# Patient Record
Sex: FEMALE | Race: WHITE | Employment: UNEMPLOYED | URBAN - METROPOLITAN AREA
[De-identification: names, ages, dates, MRNs, and addresses within clinical notes are randomized per-mention and may not be internally consistent; named-entity substitution may affect disease eponyms.]

---

## 2020-11-30 ENCOUNTER — OFFICE VISIT (OUTPATIENT)
Dept: URGENT CARE | Age: 55
End: 2020-11-30
Payer: COMMERCIAL

## 2020-11-30 VITALS — HEART RATE: 87 BPM | RESPIRATION RATE: 16 BRPM | TEMPERATURE: 98.4 F | OXYGEN SATURATION: 94 %

## 2020-11-30 DIAGNOSIS — Z20.822 SUSPECTED COVID-19 VIRUS INFECTION: Primary | ICD-10-CM

## 2020-11-30 PROCEDURE — 99202 OFFICE O/P NEW SF 15 MIN: CPT | Performed by: FAMILY MEDICINE

## 2020-12-01 NOTE — PROGRESS NOTES
This patient was seen in [facility] at [address] [place] [address] [facility] while in their vehicle due to COVID-19 pandemic with PPE and focused examination in order to decrease community viral transmission. The patient/guardian gave verbal consent to treat. The history is provided by the patient. Cough   The history is provided by the patient. This is a new problem. The current episode started 2 days ago. The problem occurs every few minutes. The problem has not changed since onset. The cough is non-productive. There has been no fever. Pertinent negatives include no chest pain. She has tried nothing for the symptoms. The treatment provided no relief. Risk factors:  has covid sxs- no known exposure. She is not a smoker. Her past medical history is significant for bronchitis. Her past medical history does not include asthma. Past Medical History:   Diagnosis Date    Ill-defined condition     graves disease        Past Surgical History:   Procedure Laterality Date    HX GYN      hysterectomy         No family history on file.      Social History     Socioeconomic History    Marital status:      Spouse name: Not on file    Number of children: Not on file    Years of education: Not on file    Highest education level: Not on file   Occupational History    Not on file   Social Needs    Financial resource strain: Not on file    Food insecurity     Worry: Not on file     Inability: Not on file    Transportation needs     Medical: Not on file     Non-medical: Not on file   Tobacco Use    Smoking status: Never Smoker    Smokeless tobacco: Never Used   Substance and Sexual Activity    Alcohol use: Not on file    Drug use: Not on file    Sexual activity: Not on file   Lifestyle    Physical activity     Days per week: Not on file     Minutes per session: Not on file    Stress: Not on file   Relationships    Social connections     Talks on phone: Not on file     Gets together: Not on file Attends Gnosticism service: Not on file     Active member of club or organization: Not on file     Attends meetings of clubs or organizations: Not on file     Relationship status: Not on file    Intimate partner violence     Fear of current or ex partner: Not on file     Emotionally abused: Not on file     Physically abused: Not on file     Forced sexual activity: Not on file   Other Topics Concern    Not on file   Social History Narrative    Not on file                ALLERGIES: Sulfa (sulfonamide antibiotics)    Review of Systems   Respiratory: Positive for cough. Cardiovascular: Negative for chest pain. All other systems reviewed and are negative. Vitals:    11/30/20 2000   Pulse: 87   Resp: 16   Temp: 98.4 °F (36.9 °C)   SpO2: 94%       Physical Exam  Vitals signs and nursing note reviewed. Constitutional:       General: She is not in acute distress. Appearance: She is not ill-appearing. Pulmonary:      Effort: Pulmonary effort is normal. No respiratory distress. Breath sounds: No wheezing. MDM    Procedures        ICD-10-CM ICD-9-CM    1. Suspected COVID-19 virus infection  Z20.828 V01.79 NOVEL CORONAVIRUS (COVID-19)     Use Mucinex DM twice a day    No orders of the defined types were placed in this encounter. No results found for any visits on 11/30/20. The patients condition was discussed with the patient and they understand. The patient is to follow up with primary care doctor. If signs and symptoms become worse the pt is to go to the ER. The patient is to take medications as prescribed.

## 2020-12-04 LAB — SARS-COV-2, NAA: NOT DETECTED

## 2022-05-04 ENCOUNTER — HOSPITAL ENCOUNTER (EMERGENCY)
Age: 57
Discharge: ANOTHER HOSPITAL | End: 2022-05-05
Attending: EMERGENCY MEDICINE
Payer: COMMERCIAL

## 2022-05-04 ENCOUNTER — APPOINTMENT (OUTPATIENT)
Dept: CT IMAGING | Age: 57
End: 2022-05-04
Attending: EMERGENCY MEDICINE
Payer: COMMERCIAL

## 2022-05-04 ENCOUNTER — APPOINTMENT (OUTPATIENT)
Dept: ULTRASOUND IMAGING | Age: 57
End: 2022-05-04
Attending: EMERGENCY MEDICINE
Payer: COMMERCIAL

## 2022-05-04 DIAGNOSIS — N30.00 ACUTE CYSTITIS WITHOUT HEMATURIA: ICD-10-CM

## 2022-05-04 DIAGNOSIS — E89.0 POSTABLATIVE HYPOTHYROIDISM: ICD-10-CM

## 2022-05-04 DIAGNOSIS — R25.1 TREMOR: ICD-10-CM

## 2022-05-04 DIAGNOSIS — G47.00 INSOMNIA, UNSPECIFIED TYPE: ICD-10-CM

## 2022-05-04 DIAGNOSIS — R44.0 AUDITORY HALLUCINATION: Primary | ICD-10-CM

## 2022-05-04 LAB
ALBUMIN SERPL-MCNC: 4.5 G/DL (ref 3.5–5)
ALBUMIN/GLOB SERPL: 1.2 {RATIO} (ref 1.1–2.2)
ALP SERPL-CCNC: 93 U/L (ref 45–117)
ALT SERPL-CCNC: 52 U/L (ref 12–78)
AMPHET UR QL SCN: NEGATIVE
ANION GAP SERPL CALC-SCNC: 7 MMOL/L (ref 5–15)
APAP SERPL-MCNC: <2 UG/ML (ref 10–30)
APPEARANCE UR: ABNORMAL
AST SERPL-CCNC: 36 U/L (ref 15–37)
BACTERIA URNS QL MICRO: NEGATIVE /HPF
BARBITURATES UR QL SCN: NEGATIVE
BENZODIAZ UR QL: NEGATIVE
BILIRUB SERPL-MCNC: 0.5 MG/DL (ref 0.2–1)
BILIRUB UR QL: NEGATIVE
BUN SERPL-MCNC: 12 MG/DL (ref 6–20)
BUN/CREAT SERPL: 10 (ref 12–20)
CALCIUM SERPL-MCNC: 9.1 MG/DL (ref 8.5–10.1)
CANNABINOIDS UR QL SCN: NEGATIVE
CHLORIDE SERPL-SCNC: 100 MMOL/L (ref 97–108)
CO2 SERPL-SCNC: 26 MMOL/L (ref 21–32)
COCAINE UR QL SCN: NEGATIVE
COLOR UR: ABNORMAL
COVID-19 RAPID TEST, COVR: NOT DETECTED
CREAT SERPL-MCNC: 1.18 MG/DL (ref 0.55–1.02)
DRUG SCRN COMMENT,DRGCM: NORMAL
EPITH CASTS URNS QL MICRO: ABNORMAL /LPF
ERYTHROCYTE [DISTWIDTH] IN BLOOD BY AUTOMATED COUNT: 14.3 % (ref 11.5–14.5)
GLOBULIN SER CALC-MCNC: 3.8 G/DL (ref 2–4)
GLUCOSE SERPL-MCNC: 145 MG/DL (ref 65–100)
GLUCOSE UR STRIP.AUTO-MCNC: NEGATIVE MG/DL
HCT VFR BLD AUTO: 44.4 % (ref 35–47)
HGB BLD-MCNC: 14.4 G/DL (ref 11.5–16)
HGB UR QL STRIP: ABNORMAL
HYALINE CASTS URNS QL MICRO: ABNORMAL /LPF (ref 0–2)
KETONES UR QL STRIP.AUTO: NEGATIVE MG/DL
LEUKOCYTE ESTERASE UR QL STRIP.AUTO: ABNORMAL
LIPASE SERPL-CCNC: 95 U/L (ref 73–393)
MCH RBC QN AUTO: 30.1 PG (ref 26–34)
MCHC RBC AUTO-ENTMCNC: 32.4 G/DL (ref 30–36.5)
MCV RBC AUTO: 92.7 FL (ref 80–99)
METHADONE UR QL: NEGATIVE
NITRITE UR QL STRIP.AUTO: NEGATIVE
NRBC # BLD: 0 K/UL (ref 0–0.01)
NRBC BLD-RTO: 0 PER 100 WBC
OPIATES UR QL: NEGATIVE
PCP UR QL: NEGATIVE
PH UR STRIP: 7 [PH] (ref 5–8)
PLATELET # BLD AUTO: 283 K/UL (ref 150–400)
PMV BLD AUTO: 10 FL (ref 8.9–12.9)
POTASSIUM SERPL-SCNC: 3.4 MMOL/L (ref 3.5–5.1)
PROT SERPL-MCNC: 8.3 G/DL (ref 6.4–8.2)
PROT UR STRIP-MCNC: ABNORMAL MG/DL
RBC # BLD AUTO: 4.79 M/UL (ref 3.8–5.2)
RBC #/AREA URNS HPF: ABNORMAL /HPF (ref 0–5)
SALICYLATES SERPL-MCNC: <1.7 MG/DL (ref 2.8–20)
SODIUM SERPL-SCNC: 133 MMOL/L (ref 136–145)
SOURCE, COVRS: NORMAL
SP GR UR REFRACTOMETRY: 1.01
T4 FREE SERPL-MCNC: 0.2 NG/DL (ref 0.8–1.5)
TSH SERPL DL<=0.05 MIU/L-ACNC: >100 UIU/ML (ref 0.36–3.74)
UROBILINOGEN UR QL STRIP.AUTO: 1 EU/DL (ref 0.2–1)
WBC # BLD AUTO: 13.6 K/UL (ref 3.6–11)
WBC URNS QL MICRO: >100 /HPF (ref 0–4)

## 2022-05-04 PROCEDURE — 84439 ASSAY OF FREE THYROXINE: CPT

## 2022-05-04 PROCEDURE — 84443 ASSAY THYROID STIM HORMONE: CPT

## 2022-05-04 PROCEDURE — 96360 HYDRATION IV INFUSION INIT: CPT

## 2022-05-04 PROCEDURE — 70450 CT HEAD/BRAIN W/O DYE: CPT

## 2022-05-04 PROCEDURE — 81001 URINALYSIS AUTO W/SCOPE: CPT

## 2022-05-04 PROCEDURE — 87635 SARS-COV-2 COVID-19 AMP PRB: CPT

## 2022-05-04 PROCEDURE — 36415 COLL VENOUS BLD VENIPUNCTURE: CPT

## 2022-05-04 PROCEDURE — 74011250636 HC RX REV CODE- 250/636: Performed by: EMERGENCY MEDICINE

## 2022-05-04 PROCEDURE — 85027 COMPLETE CBC AUTOMATED: CPT

## 2022-05-04 PROCEDURE — 93970 EXTREMITY STUDY: CPT

## 2022-05-04 PROCEDURE — 99285 EMERGENCY DEPT VISIT HI MDM: CPT

## 2022-05-04 PROCEDURE — 80307 DRUG TEST PRSMV CHEM ANLYZR: CPT

## 2022-05-04 PROCEDURE — 93005 ELECTROCARDIOGRAM TRACING: CPT

## 2022-05-04 PROCEDURE — 80143 DRUG ASSAY ACETAMINOPHEN: CPT

## 2022-05-04 PROCEDURE — 80053 COMPREHEN METABOLIC PANEL: CPT

## 2022-05-04 PROCEDURE — 74011250637 HC RX REV CODE- 250/637: Performed by: EMERGENCY MEDICINE

## 2022-05-04 PROCEDURE — 83690 ASSAY OF LIPASE: CPT

## 2022-05-04 PROCEDURE — 80179 DRUG ASSAY SALICYLATE: CPT

## 2022-05-04 RX ORDER — LEVOTHYROXINE SODIUM 137 UG/1
137 TABLET ORAL
Qty: 30 TABLET | Refills: 0 | Status: SHIPPED | OUTPATIENT
Start: 2022-05-04 | End: 2022-05-11

## 2022-05-04 RX ORDER — LORAZEPAM 0.5 MG/1
1 TABLET ORAL
Qty: 20 TABLET | Refills: 0 | Status: SHIPPED | OUTPATIENT
Start: 2022-05-04 | End: 2022-05-11

## 2022-05-04 RX ORDER — DIAZEPAM 5 MG/1
5 TABLET ORAL
Status: COMPLETED | OUTPATIENT
Start: 2022-05-04 | End: 2022-05-04

## 2022-05-04 RX ORDER — CEPHALEXIN 500 MG/1
500 CAPSULE ORAL 4 TIMES DAILY
Qty: 20 CAPSULE | Refills: 0 | Status: SHIPPED | OUTPATIENT
Start: 2022-05-04 | End: 2022-05-11

## 2022-05-04 RX ADMIN — SODIUM CHLORIDE 1000 ML: 9 INJECTION, SOLUTION INTRAVENOUS at 19:52

## 2022-05-04 RX ADMIN — DIAZEPAM 5 MG: 5 TABLET ORAL at 19:57

## 2022-05-04 NOTE — ED PROVIDER NOTES
EMERGENCY DEPARTMENT HISTORY AND PHYSICAL EXAM      Date: 5/4/2022  Patient Name: Nilsa Monte    History of Presenting Illness     Chief Complaint   Patient presents with    Anxiety     feeling over whelmed    Mental Health Problem     hearing voices, for about a month, not sleeping, not eating normal,denies SI and HI    Generalized Body Aches    Constipation       History Provided By: Patient, Patient's Mother and Patient's     HPI: Nilsa Monte, 49 y.o. female presents to the ED with cc of anxiety and mental health concern. 61-year-old female with a past medical history notable for Graves' disease status post ablation previously on Synthroid (patient self discontinued), hysterectomy, hyperlipidemia hypertension presents to the emergency department with a chief complaint of anxiety. Patient reports since January she has noted a depressed mood, insomnia and lack of interest in things. She reports she has been \"more shut in\" in her house. Patient reports over the past month she has been noting auditory hallucinations. She reports hearing footsteps and voices. Today the voices said \"get out. \"  Patient reports she drove to her mother's house and was feeling extremely anxious and tremulous. She reports she was twitching with her eyelids and arms. No history of seizures. Felt as though her legs were heavy. Patient has been on Cymbalta in the past, not currently on any medications. She denies any suicidal homicidal ideation. Patient states that she has had \"spells\" in the past, however these are short-lived. This has been more persistent. Patient also reports that she has had generalized myalgias for 3 weeks. Bilateral legs \" heavy. \"  Reports left greater than right leg swelling. Family history of DVT. Also reports some constipation, no abdominal pain or vomiting. Reports significant anxiety today. Patient does report a baseline of insomnia.     There are no other complaints, changes, or physical findings at this time. PCP: Jeovany Carvajal MD    No current facility-administered medications on file prior to encounter. Current Outpatient Medications on File Prior to Encounter   Medication Sig Dispense Refill    levothyroxine sodium (SYNTHROID PO) Take  by mouth.  atorvastatin calcium (ATORVASTATIN PO) Take  by mouth. Past History     Past Medical History:  Past Medical History:   Diagnosis Date    Ill-defined condition     graves disease       Past Surgical History:  Past Surgical History:   Procedure Laterality Date    HX GYN      hysterectomy       Family History:  No family history on file. Social History:  Social History     Tobacco Use    Smoking status: Never Smoker    Smokeless tobacco: Never Used   Substance Use Topics    Alcohol use: Not on file    Drug use: Not on file       Allergies: Allergies   Allergen Reactions    Sulfa (Sulfonamide Antibiotics) Hives         Review of Systems   Review of Systems   Constitutional: Negative for activity change, chills and fever. HENT: Negative for facial swelling and voice change. Eyes: Negative for redness. Respiratory: Negative for cough, shortness of breath and wheezing. Cardiovascular: Positive for palpitations and leg swelling. Negative for chest pain. Gastrointestinal: Positive for constipation. Negative for abdominal pain, diarrhea, nausea and vomiting. Genitourinary: Negative for decreased urine volume and dysuria. Musculoskeletal: Positive for myalgias. Negative for gait problem. Skin: Negative for pallor and rash. Neurological: Negative for tremors, facial asymmetry and numbness. Psychiatric/Behavioral: Negative for agitation. The patient is nervous/anxious. All other systems reviewed and are negative. Physical Exam   Physical Exam  Vitals and nursing note reviewed.    Constitutional:       Comments: 81-year-old female, resting in bed, no acute distress   HENT: Head: Normocephalic and atraumatic. Cardiovascular:      Rate and Rhythm: Normal rate and regular rhythm. Heart sounds: No murmur heard. No friction rub. No gallop. Pulmonary:      Effort: Pulmonary effort is normal.      Breath sounds: Normal breath sounds. Comments: Not hypoxic on room air  Abdominal:      Palpations: Abdomen is soft. Tenderness: There is no abdominal tenderness. Musculoskeletal:         General: Swelling present. Normal range of motion. Cervical back: Normal range of motion. No rigidity. Comments: Left greater than right pedal edema   Skin:     General: Skin is warm. Findings: No rash. Neurological:      General: No focal deficit present. Mental Status: She is alert. Sensory: No sensory deficit. Motor: No weakness. Psychiatric:         Attention and Perception: Attention normal.         Mood and Affect: Mood and affect normal.         Speech: Speech normal.         Behavior: Behavior normal. Behavior is not agitated. Behavior is cooperative. Thought Content: Thought content does not include homicidal or suicidal ideation. Thought content does not include homicidal or suicidal plan.          Cognition and Memory: Cognition normal.         Judgment: Judgment normal.         Diagnostic Study Results     Labs -     Recent Results (from the past 12 hour(s))   CBC W/O DIFF    Collection Time: 05/04/22  4:19 PM   Result Value Ref Range    WBC 13.6 (H) 3.6 - 11.0 K/uL    RBC 4.79 3.80 - 5.20 M/uL    HGB 14.4 11.5 - 16.0 g/dL    HCT 44.4 35.0 - 47.0 %    MCV 92.7 80.0 - 99.0 FL    MCH 30.1 26.0 - 34.0 PG    MCHC 32.4 30.0 - 36.5 g/dL    RDW 14.3 11.5 - 14.5 %    PLATELET 451 749 - 269 K/uL    MPV 10.0 8.9 - 12.9 FL    NRBC 0.0 0  WBC    ABSOLUTE NRBC 0.00 0.00 - 7.45 K/uL   METABOLIC PANEL, COMPREHENSIVE    Collection Time: 05/04/22  4:19 PM   Result Value Ref Range    Sodium 133 (L) 136 - 145 mmol/L    Potassium 3.4 (L) 3.5 - 5.1 mmol/L    Chloride 100 97 - 108 mmol/L    CO2 26 21 - 32 mmol/L    Anion gap 7 5 - 15 mmol/L    Glucose 145 (H) 65 - 100 mg/dL    BUN 12 6 - 20 MG/DL    Creatinine 1.18 (H) 0.55 - 1.02 MG/DL    BUN/Creatinine ratio 10 (L) 12 - 20      GFR est AA 58 (L) >60 ml/min/1.73m2    GFR est non-AA 48 (L) >60 ml/min/1.73m2    Calcium 9.1 8.5 - 10.1 MG/DL    Bilirubin, total 0.5 0.2 - 1.0 MG/DL    ALT (SGPT) 52 12 - 78 U/L    AST (SGOT) 36 15 - 37 U/L    Alk.  phosphatase 93 45 - 117 U/L    Protein, total 8.3 (H) 6.4 - 8.2 g/dL    Albumin 4.5 3.5 - 5.0 g/dL    Globulin 3.8 2.0 - 4.0 g/dL    A-G Ratio 1.2 1.1 - 2.2     URINALYSIS W/ RFLX MICROSCOPIC    Collection Time: 05/04/22  4:19 PM   Result Value Ref Range    Color YELLOW/STRAW      Appearance CLOUDY (A) CLEAR      Specific gravity 1.012      pH (UA) 7.0 5.0 - 8.0      Protein TRACE (A) NEG mg/dL    Glucose Negative NEG mg/dL    Ketone Negative NEG mg/dL    Bilirubin Negative NEG      Blood TRACE (A) NEG      Urobilinogen 1.0 0.2 - 1.0 EU/dL    Nitrites Negative NEG      Leukocyte Esterase LARGE (A) NEG      WBC >100 (H) 0 - 4 /hpf    RBC 0-5 0 - 5 /hpf    Epithelial cells FEW FEW /lpf    Bacteria Negative NEG /hpf    Hyaline cast 0-2 0 - 2 /lpf   LIPASE    Collection Time: 05/04/22  4:19 PM   Result Value Ref Range    Lipase 95 73 - 139 U/L   SALICYLATE    Collection Time: 05/04/22  4:19 PM   Result Value Ref Range    Salicylate level <4.4 (L) 2.8 - 20.0 MG/DL   ACETAMINOPHEN    Collection Time: 05/04/22  4:19 PM   Result Value Ref Range    Acetaminophen level <2 (L) 10 - 30 ug/mL   TSH 3RD GENERATION    Collection Time: 05/04/22  4:19 PM   Result Value Ref Range    TSH >100.00 (H) 0.36 - 3.74 uIU/mL   EKG, 12 LEAD, INITIAL    Collection Time: 05/04/22  8:05 PM   Result Value Ref Range    Ventricular Rate 80 BPM    Atrial Rate 80 BPM    P-R Interval 142 ms    QRS Duration 70 ms    Q-T Interval 392 ms    QTC Calculation (Bezet) 452 ms    Calculated P Axis 58 degrees Calculated R Axis 46 degrees    Calculated T Axis -33 degrees    Diagnosis       Normal sinus rhythm  Low voltage QRS  Nonspecific T wave abnormality  No previous ECGs available     COVID-19 RAPID TEST    Collection Time: 05/04/22  8:08 PM   Result Value Ref Range    Specimen source Nasopharyngeal      COVID-19 rapid test Not detected NOTD         Radiologic Studies -   DUPLEX LOWER EXT VENOUS BILAT   Final Result      CT HEAD WO CONT    (Results Pending)     CT Results  (Last 48 hours)    None        CXR Results  (Last 48 hours)    None          Medical Decision Making   I am the first provider for this patient. I reviewed the vital signs, available nursing notes, past medical history, past surgical history, family history and social history. Vital Signs-Reviewed the patient's vital signs. Patient Vitals for the past 12 hrs:   Temp Pulse Resp BP SpO2   05/04/22 2134     95 %   05/04/22 2130    (!) 142/97 94 %   05/04/22 2112 98.4 °F (36.9 °C) 77 16 (!) 132/99 93 %   05/04/22 2039    115/86 91 %   05/04/22 2024    124/84 92 %   05/04/22 2009    (!) 135/93 94 %   05/04/22 1957    (!) 141/100 93 %   05/04/22 1551 97.8 °F (36.6 °C) 95 18 (!) 150/84 96 %     Records Reviewed: Nursing Notes and Old Medical Records    Provider Notes (Medical Decision Making):     70-year-old female presents to the emergency department with multiple complaints. Patient reports generalized myalgias, leg swelling and constipation times a few weeks. This is on the background of a mental health concern of depressed mood, anhedonia and anxiety. The latter symptoms appeared to reach their peak today with increasing anxiety. In ED, vitals unremarkable. Patient adamantly denies any suicidal homicidal ideation. From a psychiatric standpoint, considerations include adjustment disorder, generalized anxiety disorder, panic disorder, major depressive disorder, auditory hallucinations can be seen with the latter.   No history of alcohol abuse, auditory hallucinations more consistent with psychiatric process. Would be unusual presentation for psychosis. Denies drug use. We will check basic labs and consult be smart. Patient also reports multiple medical complaints including leg swelling, leg heaviness and generalized weakness. Will check labs including thyroid and duplex. No fever to suspect infectious causes. COVID and basic labs given myalgias. Fluids and Valium. Patient's tremors do not appear to be seizure based on history. Will CT head given mental status change. Check EKG. ED Course:   Initial assessment performed. The patients presenting problems have been discussed, and they are in agreement with the care plan formulated and outlined with them. I have encouraged them to ask questions as they arise throughout their visit. ED Course as of 05/05/22 0922   Wed May 04, 2022   2014 Preliminary EKG interpreted by me. Shows normal sinus rhythm with a HR of 80. No ST elevations or depressions concerning for ischemia. Normal intervals. [MB]   2032 CBC reviewed with mild leukocytosis. CMP is unremarkable, mild hyponatremia noted, but would not suspect this to be causing patient's symptoms. Lipase reassuring. Urinalysis with leukesterase but no bacteria, will cover given patient's leukocytosis. Duplex negative. [MB]   2045 TSH >100, patient no longer taking synthroid, clinically this does not appear to be myxedema coma as there is no hyponatremia, hypothermia, bradycardia. Discussed this with the patient, will resume on her old dose. Will give endocrinology follow-up. Also requesting PCP referrals. [MB]   0776 Patient signed out pending [facility] consult and CT. Anticipate discharge with psych and PCP referrals. Return precautions.  [MB]   9197 PROGRESS NOTE  11:37 PM    Pt evaluated by [facility] who feels pt would benefit from admission due to excalation of auditory hallucinations making demands of patient that she is adhering to. Will notify us of bed placement [MK]      ED Course User Index  [MB] Lorna Reynolds MD  [MK] MD Dion Waggoner MD      Disposition:    Transferred to Another Facility      Diagnosis     Clinical Impression:   1. Postablative hypothyroidism    2. Acute cystitis without hematuria    3. Auditory hallucination    4. Insomnia, unspecified type    5. Tremor        Attestations:    Dino Davison MD    Please note that this dictation was completed with [employer]+, the computer voice recognition software. Quite often unanticipated grammatical, syntax, homophones, and other interpretive errors are inadvertently transcribed by the computer software. Please disregard these errors. Please excuse any errors that have escaped final proofreading. Thank you.

## 2022-05-05 ENCOUNTER — HOSPITAL ENCOUNTER (INPATIENT)
Age: 57
LOS: 6 days | Discharge: HOME | DRG: 885 | End: 2022-05-11
Attending: PSYCHIATRY & NEUROLOGY | Admitting: PSYCHIATRY & NEUROLOGY
Payer: COMMERCIAL

## 2022-05-05 VITALS
TEMPERATURE: 98.3 F | HEART RATE: 72 BPM | SYSTOLIC BLOOD PRESSURE: 113 MMHG | BODY MASS INDEX: 36.53 KG/M2 | DIASTOLIC BLOOD PRESSURE: 69 MMHG | WEIGHT: 227.29 LBS | RESPIRATION RATE: 16 BRPM | HEIGHT: 66 IN | OXYGEN SATURATION: 92 %

## 2022-05-05 DIAGNOSIS — F33.3 SEVERE RECURRENT MAJOR DEPRESSION WITH PSYCHOTIC FEATURES (HCC): ICD-10-CM

## 2022-05-05 PROBLEM — F32.9 MAJOR DEPRESSIVE DISORDER: Status: ACTIVE | Noted: 2022-05-05

## 2022-05-05 PROBLEM — F33.2 SEVERE RECURRENT MAJOR DEPRESSION WITHOUT PSYCHOTIC FEATURES (HCC): Status: RESOLVED | Noted: 2022-05-05 | Resolved: 2022-05-05

## 2022-05-05 PROBLEM — F33.2 SEVERE RECURRENT MAJOR DEPRESSION WITHOUT PSYCHOTIC FEATURES (HCC): Status: ACTIVE | Noted: 2022-05-05

## 2022-05-05 LAB
ATRIAL RATE: 80 BPM
CALCULATED P AXIS, ECG09: 58 DEGREES
CALCULATED R AXIS, ECG10: 46 DEGREES
CALCULATED T AXIS, ECG11: -33 DEGREES
COVID-19 RAPID TEST, COVR: NOT DETECTED
DIAGNOSIS, 93000: NORMAL
FLUAV RNA SPEC QL NAA+PROBE: NOT DETECTED
FLUBV RNA SPEC QL NAA+PROBE: NOT DETECTED
P-R INTERVAL, ECG05: 142 MS
Q-T INTERVAL, ECG07: 392 MS
QRS DURATION, ECG06: 70 MS
QTC CALCULATION (BEZET), ECG08: 452 MS
SARS-COV-2, COV2: NORMAL
SARS-COV-2, COV2: NORMAL
SARS-COV-2, COV2: NOT DETECTED
SOURCE, COVRS: NORMAL
VENTRICULAR RATE, ECG03: 80 BPM

## 2022-05-05 PROCEDURE — 65220000003 HC RM SEMIPRIVATE PSYCH

## 2022-05-05 PROCEDURE — 87636 SARSCOV2 & INF A&B AMP PRB: CPT

## 2022-05-05 PROCEDURE — 87635 SARS-COV-2 COVID-19 AMP PRB: CPT

## 2022-05-05 PROCEDURE — 74011250637 HC RX REV CODE- 250/637: Performed by: EMERGENCY MEDICINE

## 2022-05-05 PROCEDURE — 74011250637 HC RX REV CODE- 250/637: Performed by: PSYCHIATRY & NEUROLOGY

## 2022-05-05 RX ORDER — IBUPROFEN 200 MG
1 TABLET ORAL
Status: DISCONTINUED | OUTPATIENT
Start: 2022-05-05 | End: 2022-05-11 | Stop reason: HOSPADM

## 2022-05-05 RX ORDER — IBUPROFEN 400 MG/1
400 TABLET ORAL
Status: DISCONTINUED | OUTPATIENT
Start: 2022-05-05 | End: 2022-05-11 | Stop reason: HOSPADM

## 2022-05-05 RX ORDER — LEVOTHYROXINE SODIUM 50 UG/1
137.5 TABLET ORAL
Status: DISCONTINUED | OUTPATIENT
Start: 2022-05-05 | End: 2022-05-05 | Stop reason: HOSPADM

## 2022-05-05 RX ORDER — TRAZODONE HYDROCHLORIDE 50 MG/1
50 TABLET ORAL
Status: DISCONTINUED | OUTPATIENT
Start: 2022-05-05 | End: 2022-05-11 | Stop reason: HOSPADM

## 2022-05-05 RX ORDER — MAG HYDROX/ALUMINUM HYD/SIMETH 200-200-20
30 SUSPENSION, ORAL (FINAL DOSE FORM) ORAL
Status: DISCONTINUED | OUTPATIENT
Start: 2022-05-05 | End: 2022-05-11 | Stop reason: HOSPADM

## 2022-05-05 RX ORDER — RISPERIDONE 0.5 MG/1
0.5 TABLET, FILM COATED ORAL EVERY EVENING
Status: DISCONTINUED | OUTPATIENT
Start: 2022-05-05 | End: 2022-05-07

## 2022-05-05 RX ORDER — ADHESIVE BANDAGE
30 BANDAGE TOPICAL DAILY PRN
Status: DISCONTINUED | OUTPATIENT
Start: 2022-05-05 | End: 2022-05-11 | Stop reason: HOSPADM

## 2022-05-05 RX ORDER — ACETAMINOPHEN 500 MG
1000 TABLET ORAL
Status: DISCONTINUED | OUTPATIENT
Start: 2022-05-05 | End: 2022-05-11 | Stop reason: HOSPADM

## 2022-05-05 RX ORDER — HYDROXYZINE PAMOATE 50 MG/1
50 CAPSULE ORAL
Status: DISCONTINUED | OUTPATIENT
Start: 2022-05-05 | End: 2022-05-11 | Stop reason: HOSPADM

## 2022-05-05 RX ORDER — CEPHALEXIN 250 MG/1
500 CAPSULE ORAL 4 TIMES DAILY
Status: DISCONTINUED | OUTPATIENT
Start: 2022-05-05 | End: 2022-05-05 | Stop reason: HOSPADM

## 2022-05-05 RX ADMIN — HYDROXYZINE PAMOATE 50 MG: 50 CAPSULE ORAL at 17:43

## 2022-05-05 RX ADMIN — LEVOTHYROXINE SODIUM 137.5 MCG: 0.05 TABLET ORAL at 10:11

## 2022-05-05 RX ADMIN — CEPHALEXIN 500 MG: 250 CAPSULE ORAL at 10:10

## 2022-05-05 RX ADMIN — RISPERIDONE 0.5 MG: 0.5 TABLET ORAL at 17:15

## 2022-05-05 NOTE — PROGRESS NOTES
Problem: Anxiety-Behavioral Health (Adult/Pediatric)  Goal: *STG: Remains safe in hospital  Outcome: Progressing Towards Goal     Problem: Anxiety-Behavioral Health (Adult/Pediatric)  Goal: *STG: Participates in treatment plan  Outcome: Progressing Towards Goal

## 2022-05-05 NOTE — BSMART NOTE
Awaiting PCR test per access for patient placement. This writer spoke with charge nurse to determine results of test and as reported by [facility] it hasnot resulted as of yet.

## 2022-05-05 NOTE — ED NOTES
Be smart computer at bedside, . Pt appears calmer , resting with eyes closed, easily arousable. Family at bedside, updated of plan of care. Will cont to monitor closely.

## 2022-05-05 NOTE — ED NOTES
Pt calm and cooperative, Family member at bedside. Waiting on covid results, contacted lab regarding delay. Updated pt of plan of care.

## 2022-05-05 NOTE — BH NOTES
Patient arrived on the unit voluntary. She reports AH for one month, not sleeping, eating and feeling anxious and overwhelmed. She reports she has been isolating and secluding herself since January. She denies any outpatient providers for mental health or inpatient admissions. She plans on returning back home with family. She is able to care for herself and feels she has good support. She will be referred to out patient [place] [id number] services.

## 2022-05-05 NOTE — GROUP NOTE
STEVIE  GROUP DOCUMENTATION INDIVIDUAL                                                                          Group Therapy Note    Date: 5/5/2022    Group Start Time: 1400  Group End Time: 1391  Group Topic: Process Group - Inpatient    [address], [address] [facility] GROUP DOCUMENTATION GROUP    Group Therapy Note    Attendees: Focus of session was a discussion of the question: Is there a problem in your life that you could solve, or make better, if you would? We spoke about the need to look at all areas of your life: physical health, mental health, emotional health, relationships, self-care, spiritual and to ask ourselves what we could fix if we were willing to do the work and put in the time. We spoke about specific excuses that are being made and how we can overcome those excuses. Attendance: Attended    Patient's Goal:       Demonstrates effective anxiety reduction strategies               Interventions/techniques: Informed, Validated, Promoted peer support and Supported    Follows Directions: Followed directions    Interactions: Interacted appropriately    Mental Status: Congruent, Flat, Preoccupied and Suspicious    Behavior/appearance: Attentive, Cooperative, Needed prompting and Withdrawn/quiet    Goals Achieved: Able to engage in interactions, Discussed coping and Identified feelings      Additional Notes:  Inna Vizcarra participated in group with prompting. She spoke about how she is really tired after being in ER last night. She did engage with prompting. She spoke about how she has been isolative during the pandemic and has been frustrated with many aspects of it. She did not share specific symptoms but she would engage with prompting.       Singh Francisco

## 2022-05-05 NOTE — PROGRESS NOTES
Problem: Anxiety-Behavioral Health (Adult/Pediatric)  Goal: *STG: Attends activities and groups  Outcome: Progressing Towards Goal

## 2022-05-05 NOTE — BH NOTES
Patient arrived on the unit with EMS. Patient had a saline lock  20 guage in the left [facility] upon arrival to the [facility]. This writer removed the saline lock.

## 2022-05-05 NOTE — H&P
[facility]   Admission History & Physical        5/5/2022 5:44 PM  Patient: Syed Triplett 1965  PCP: Nathanael Orellana MD    HISTORY  Chief Complaint:   HPI:    24-year-old female with a past medical history of  Graves' disease status post ablation and placed on  on Synthroid (patient self discontinued), hysterectomy, hyperlipidemia, hypertension, and Anxiety. She reported worsening anxiety and depression, insomnia and lack of interest in things. Today she felt feeling extremely anxious and tremulous with  twitching of  her eyelids and arms. Reported feeling generalized weakness and lack of energy with no focal neurological deficits. Reported palpitations. She has been started on Keflex recently for acute UTI. Currently no dysuria ,no  urgency or frequency  No fever no shortness of breath. No cough no chest pain. Reported intermittent swelling of both feet especially with standing. No nausea ,no vomiting or diarrhea. Reported chronic constipation. Stool is not dark, not bloodstained or maroon-colored. Past Medical History:  Past Medical History:   Diagnosis Date    Anxiety disorder     Depression     Hypertension     Ill-defined condition     graves disease    Thyroid disease      Past Surgical History:  Past Surgical History:   Procedure Laterality Date    HX GYN      hysterectomy     Medication:  Prior to Admission medications    Medication Sig Start Date End Date Taking? Authorizing Provider   cephALEXin (Keflex) 500 mg capsule Take 1 Capsule by mouth four (4) times daily for 5 days. 5/4/22 5/9/22 Yes Christal Mcbride MD   levothyroxine (Synthroid) 137 mcg tablet Take 1 Tablet by mouth Daily (before breakfast). 5/4/22  Yes Christal Mcbride MD   LORazepam (Ativan) 0.5 mg tablet Take 2 Tablets by mouth every eight (8) hours as needed for Anxiety. Max Daily Amount: 3 mg. 5/4/22   Christal Mcbride MD   levothyroxine sodium (SYNTHROID PO) Take  by mouth. Provider, Historical   atorvastatin calcium (ATORVASTATIN PO) Take  by mouth. Provider, Historical     Allergies: Allergies   Allergen Reactions    Sulfa (Sulfonamide Antibiotics) Hives     Social History:  Social History     Tobacco Use    Smoking status: Former Smoker     Packs/day: 1.00     Years: 15.00     Pack years: 15.00     Quit date: 5/3/2021     Years since quittin.0    Smokeless tobacco: Never Used    Tobacco comment: Pt quit smoking over 1 year ago. Pt anxious at this time. Vaping Use    Vaping Use: Never used   Substance Use Topics    Alcohol use: Yes     Alcohol/week: 1.0 - 2.0 standard drink     Types: 1 - 2 Cans of beer per week     Comment: Only drinks socially or special occasions    Drug use: Never     Family History:  No family history on file. Review of systems:   Total of 12 systems reviewed as follows:  POSITIVE= bolded text  Negative = text not bolded       General:  fever, chills, sweats, generalized weakness, weight loss/gain, loss of appetite   Eyes:    blurred vision, eye pain, loss of vision, double vision  ENT:    rhinorrhea, pharyngitis   Respiratory:  cough, sputum production, SOB, HERNANDEZ, wheezing, pleuritic pain   Cardiology:   chest pain, palpitations, orthopnea, PND, edema, syncope   Gastrointestinal:  abdominal pain , N/V, diarrhea, dysphagia, constipation, bleeding   Genitourinary:  frequency, urgency, dysuria, hematuria, incontinence, prostatism   Muskuloskeletal: arthralgia, myalgia, back pain  Hematology:   easy bruising, nose or gum bleeding, lymphadenopathy   Dermatological: rash, ulceration, pruritis, color change / jaundice  Endocrine:   hot flashes or polydipsia   Neurological:  headache, dizziness, confusion, focal weakness, paresthesia, speech difficulties, memory loss, gait difficulty  Psychological: feelings of anxiety, depression, agitation    PHYSICAL EXAM:  Patient Vitals for the past 24 hrs:   Temp Pulse Resp BP SpO2   22 1205 (!) 96.5 °F (35.8 °C) 82 18 119/76 94 %     General:    Alert, cooperative, no distress, appears stated age. HEENT: Atraumatic, anicteric sclerae, pink conjunctivae     No oral ulcers, mucosa moist, throat clear, dentition fair  Neck:  Supple, symmetrical;   thyroid non tender  Lungs:   Clear to auscultation bilaterally. No wheezing or rhonchi. No rales. Chest wall:  No tenderness. No accessory muscle use. Heart:   Regular rhythm. No  murmur. No edema  Abdomen:   Soft, non-tender. Not distended. Bowel sounds normal  Extremities: No cyanosis. No clubbing      Capillary refill normal,  Radial pulse 2+,  DP normal  Skin:     Not pale. Not jaundiced. No rashes   Psych:  Good insight. Not depressed. Not anxious or agitated. Neurologic: EOMs intact. No facial asymmetry. No aphasia or slurred speech. Symmetrical strength, Sensation grossly intact. Alert and oriented X 4.     Lab Data Reviewed:    Recent Results (from the past 24 hour(s))   EKG, 12 LEAD, INITIAL    Collection Time: 05/04/22  8:05 PM   Result Value Ref Range    Ventricular Rate 80 BPM    Atrial Rate 80 BPM    P-R Interval 142 ms    QRS Duration 70 ms    Q-T Interval 392 ms    QTC Calculation (Bezet) 452 ms    Calculated P Axis 58 degrees    Calculated R Axis 46 degrees    Calculated T Axis -33 degrees    Diagnosis       Normal sinus rhythm  Low voltage QRS  Nonspecific T wave abnormality  No previous ECGs available  Confirmed by Kristian Mcgowan ([id number]) on 5/5/2022 2:20:08 PM     COVID-19 RAPID TEST    Collection Time: 05/04/22  8:08 PM   Result Value Ref Range    Specimen source Nasopharyngeal      COVID-19 rapid test Not detected NOTD     DRUG SCREEN, URINE    Collection Time: 05/04/22 10:00 PM   Result Value Ref Range    AMPHETAMINES Negative NEG      BARBITURATES Negative NEG      BENZODIAZEPINES Negative NEG      COCAINE Negative NEG      METHADONE Negative NEG      OPIATES Negative NEG      PCP(PHENCYCLIDINE) Negative NEG      THC (TH-CANNABINOL) Negative NEG      Drug screen comment (NOTE)    SARS-COV-2    Collection Time: 05/05/22 12:50 AM   Result Value Ref Range    SARS-CoV-2 by PCR Please find results under separate order     SARS-COV-2    Collection Time: 05/05/22  5:43 AM   Result Value Ref Range    SARS-CoV-2 by PCR Please find results under separate order     COVID-19 RAPID TEST    Collection Time: 05/05/22  5:43 AM   Result Value Ref Range    Specimen source Nasopharyngeal      COVID-19 rapid test Not detected NOTD     COVID-19 WITH INFLUENZA A/B    Collection Time: 05/05/22  5:43 AM   Result Value Ref Range    SARS-CoV-2 by PCR Not detected NOTD      Influenza A by PCR Not detected NOTD      Influenza B by PCR Not detected NOTD         EKG: Normal sinus rhythm. rate 80/mint. Normal WV and  QRS duration     Radiology:   CT head 05/04/2022 : IMPRESSION No acute findings. ASSESSMENT / PLAN    Principal Problem:    Severe recurrent major depression with psychotic features (5/5/2022)  Evaluation and treatment as per psychiatry    Active problems    Acute cystitis without hematuria   Continue keflex antibiotic course. Smoking  Continue nicotine patch  Hypothyroidism  Continue Synthroid 137 MCG daily  Hyperlipidemia  Currently on no medications. Hypertension  Blood pressure is currently controlled 119/76. Not on blood pressure medication  Low-salt diet and lifestyle modification is discussed. She will follow-up with primary care provider    SAFETY: In place   Code Status:Full Code   DVT prophylaxis:Not indicated, the pt is ambulatory   Stress Ulcer prophylaxis: Not indicated  Bladder catheter:No   Family Contact Info: On file  Bedded: [facility] Room 234/0102  Disposition: TBD, likely home when stable  Admission status:  Full admission     -Tentative plan of care discussed with patient / family, who demonstrated understanding and is in agreement to the above.     Questions answered   -Discussed with RN   -Total time 60 Minutes >50% of visit spent in counseling and coordination of care. Reviewed previous records    [id number] Ghanshyam Talbot MD  [facility] Hospitalist  [phone]

## 2022-05-05 NOTE — BH NOTES
100 [facility] 60  Master Treatment Plan for Jodi Garcia    Date Treatment Plan Initiated: 04/05/2022  Treatment Plan Modalities:  Type of Modality Amount  (x minutes) Frequency (x/week) Duration (x days) Name of Responsible Staff   [address] [place] [address] meetings to encourage peer interactions 30 14 1 Britta Waterman., [facility]   Group psychotherapy to assist in building coping skills and internal controls 60 5 1 Penelope Sethi., [facility]  Gail Johns., [facility]   Therapeutic activity groups to build coping skills 61 7 1 Shade Goins., [facility]     Psychoeducation in group setting to address:   Medication education  Coping Skills  Symptom Management   60 7 1 Penelope Sethi., [facility]  Gail Johns., [facility]  Stormy Cornejo., MARCELA Thomas RN   Discharge planning   60 2 1 Jorge Burch.,    Spirituality    60 2 1 Polina Escalona.   Physician medication management   15 7 1 ZACKERY Harrison MD                                         Treatment Team Signatures    I have participated in the development of this plan of treatment and agree to its implementation.     Patient Signature       Patient Printed Name Date/Time   Social Work/Therapist Signature       Social Work/Therapist Printed Name Date/Time   MARCELA Ayoub RN RN Printed Name  Bogdan Major RN Date/Time   Other Signature     Other Signature Date/Time   MD Signature       MD Printed Name Date/Time

## 2022-05-05 NOTE — DISCHARGE INSTRUCTIONS
Please take the antibiotics for urinary tract infection and resume taking your Synthroid. Please use the Ativan to help with sleep and anxiety. Please follow-up with 1 the primary care doctors below as well as the endocrinologist above. Please follow-up with one of the psychiatric resources below.     Local Primary Care Physicians  [facility] [phone]  MD Sandra Richardson, MD Micaela Ramos MD [facility] Doctors [phone]  Chelsy Almanza, [facility]  Benito Adler, MD Jeff Gamez, MD Sarina Penningtonmarielena Russells  [phone]  Donald Akins, MD Ana Paula Farmer MD [address] [facility] [phone]  MD Edward Box, MD Sheela Espitia MD   [facility] [phone]  EDWARD CONNERGSCJ , MD Shari Oreilly MD  [facility], NP [address] [facility] [address] [phone]  MD Kayleen Enciso, MD Cheko Braswell, MD German Cordon, MD Casey Richey MD Leana Chandler, MD   5304 [facility] [phone]  Gena Muñiz MD [facility] [phone]  MD Aleksander House, NP  Mardelle Peri, MD Annah Saint, MD Elroy Hockey, MD Rubi Figueroa MD   [address] [place] [address] [phone]  MD Petr Coronado, FNP  Huma Arnold, NP  Dina Kam, MD Edel Mireles, MD Milad Lester MD [facility] [phone]  Luevenia Lean, MD Elena Bailey, MD Raynald Dory, MD Trinda Goldberg, MD Sondra Odor, MD   [address] [phone]  MD Fallon Estrada MD [place] [phone]  [facility], MD Fifi Matute, 4[address] [facility] [phone]  Klaus Ferris, MD Bill Ramirez MD Renella Europe, MD Brigida Hamel, MD Tad Prudent, NP  Vera Alfaro Raul Leonard MD [address]   [phone]  MD Mari Smith MD Wardell Brazen, MD   2102 [facility] [phone]  MD Felice Gresham, FNP  Leoan Tsai, PEDRO PABLO Tsai, SALMAP  PEDRO PABLO Bowen MD Jorden Kinds, NP   Vance Mcclain DO             Miscellaneous:  Jake Riley MD [facility] Departments     For adult and child immunizations, family planning, TB screening, STD testing and women's health services. [place]: [place] [phone]      [facility] D 25   [address]   [address]   170 [facility] 200 [facility] [address] [phone]      [address] [place] [address]          [address] [address]     For primary care services, woman and child wellness, and some clinics providing specialty care. [facility] -- 1011 [facility]. [address] [place] [address] [phone]   411 [facility] 200 [facility] 3614 [facility] [phone]   339 [facility] [address] [place] [address] [phone] 11878 [facility] TRAN.SL [address] [place] [address] [address] [place] [address]  [phone]   [address] [place] [address] [phone]   [facility] 81 [facility] [phone]   [facility] [address] [place] [address] [phone]   [facility]: [facility] [address] [facility], [address] [phone]     [place] [address] [phone]   [place]'s Outreach 5850 [facility]  [phone]   [facility]  [address], [address] ([email or web address]) [phone]         Sexual Health/Woman Wellness Clinics    For STD/HIV testing and treatment, pregnancy testing and services, men's health, birth control services, LGBT services, and hepatitis/HPV vaccine services. [employer] for [place] [employer] [address]. [facility] [address] [facility] [address] [facility] 1579 600 EYashira Parikh [phone]   [facility] [address], 5th floor [phone]   Pregnancy [address] for Women [address] [phone]         [place] [address]67 [facility] [address]   [phone]   [place]   [phone]   Women, Infant and Children's Services: [address] [phone]       [address] [phone]   [facility]   [phone]   [address] [place] [address] Pkwy   [phone]   [facility]     [phone]   1325 [facility]   [phone]   3 [facility] [phone]     [address] Peggy Gonzalez Outpatient Mental Health Providers    Accepts Insured Patients Only:  [facility]  [address]       [phone]   Near the corner of [facility] and [facility] 430 in the near [facility]. Accepts most insurance including Medicaid/Medicare. No psychiatry. On the [facility] bus line. [address] . Sharilinhsammy [phone]  [address] [facility] ([address]  [address] [place] [address]. 30 [facility], [address] [place])     [phone]   Accepts most major insurances. Psychiatry available. Some DBT groups. [facility] 100[address] [facility])    [phone]   Mixture of psychologists and psychiatrists. They do not accept Medicaid or Medicare. [facility]  [address] [place], [place] [address]ve       [phone]   Mixture of clinical social workers and psychologists.       Sliding Scale/Financial Aid/Differing Payment Options  [facility]  975 [facility])      [phone]   Our own Adriana Olvera and Arsenio Corie. Variety of treatment options, including DBT. [address]       [phone]   Provides a variety of group and individual counseling options. Insurance, Medicaid, Medicare and sliding scale      Medicaid/Medicare providers in the [address] area  [address] [place] [address]. [address]       [phone]    [facility]         [address]       [phone]    [place]  [address] [facility] [address] [place] [address] Ave    [phone] ex.  [address] [place] [address]     [phone]    [address] [place] [address]      [phone]      Services for patients without Medicaid, [employer] or Insurance  [employer]       5303 [facility],[address] & 300

## 2022-05-05 NOTE — BH NOTES
Admission Note:     Patient arrived on the unit @ 12 noon from [facility] ED on voluntary admission. Patient was escorted on the unit per stretcher by [address] and 2 [facility] personnel. Dr. Edna Vick and Nursing Supervisor were notified of patient's arrival. Consult placed for Medical H&P done by Hospitalist,. Patient alert and oriented x 4. Calm and cooperative. Affect/mood flat and depressed. Admits to auditory hallucinations that begun about 3 months ago. She also went off the thyroid medication for a couple of weeks and just today restarted it. Speech soft spoken. Denies SI and HI. No delusional statements made. Patient denied having a license with [employer]. Pt stopped smoking 1 year ago, May 3 rd, 2022. Patient was counseled on smoking cessation to include recognizing dangerous situations, coping skills, and information on quitting. Covid test negative. Treatment plan started. Pt shares that during the two years isolative and a lot of time alone she became reclusive. She could not go outside of her house. The voices recently started when she stopped thyroid medication and have continued and are getting quiet aggressive at times. She endorses that sometimes they scream at her. Discussed with Dr. Edna Vick pt arrival and admission. Risperdal 0.5 mg. PO ordered and given this evening @ 17:15 PM.    PRN Medication Documentation    Specific patient behavior that led to need for PRN medication: restlessness. Staff interventions attempted prior to PRN being given:  Therapeutic conversation  PRN medication given: Hydroxyzine 50 mg. PO anxiety. Patient response/effectiveness of PRN medication:  Resting.

## 2022-05-05 NOTE — BH NOTES
TRANSFER - IN REPORT:    Verbal report received from Suzanne Julien RN on Fred Nash  being received from [facility] ED for routine progression of care. Report consisted of patients Situation, Background, Assessment and   Recommendations(SBAR). Information from  ED Summary was reviewed with the receiving nurse. Opportunity for questions and clarification was provided. Assessment completed upon patients arrival to unit and care assumed.

## 2022-05-05 NOTE — BSMART NOTE
Comprehensive Assessment Form Part 1      Section I - Disposition    Axis I - Depressive Disorder NOS                Anxiety Disorder      The Medical Doctor to Psychiatrist conference was not completed. The Medical Doctor is in agreement with Psychiatrist disposition because of (reason) patient consents for voluntary admission. Patient requesting admission for [facility]. The plan is admit to behavioral health. The on-call Psychiatrist consulted was Dr. Cole Zheng. The admitting Psychiatrist will be Dr. Cole Zheng. The admitting Diagnosis is Depressive Disorder NOS. The Payor [facility] [employer] HMO/CHOICE PLUS/POS. The name of the representative was . This was approved for  days. The authorization number is . This writer reviewed the Markt [address] in nursing flowsheet and the risk level assigned is no risk. Based on this assessment, the risk of suicide is no risk and the plan is admit to behavioral health. Section II - Integrated Summary  Summary:  Patient is 49year old female reporting to ED hearing voices, for about a month, not sleeping, not eating normal,denies SI and HI, feeling overwhelmed. At bedside, patient reported coming to ED due to anxiety, panic and increased panic attacks. As reported it started in [facility]. Her  at bedside stated they noticed at that time a she started isolating and secluding herself. As reported the change has continued and patient currently does not interact with her adult children, mother, grandchildren,  or others as she normally would. Patient does not leave the house and stopped going to the store. Patient stated \"I don't want to see anyone, don't want to talk and I don't want to go anywhere. Patient reported feeling like she is falling apart mentally and physically. Patient denied suicidal, homicidal thoughts. Patient reported auditory hallucinations that have increased.  As reported she usually hears one voice and references the voice a male \"like an obsessive boyfriend\", patient laughed and stated but I don;t have boyfriend. Patient reported he knows her name and she does not know why, he tells her he loves her and I belong to him. Patient reported voices are day and night and everywhere she goes. Patient reported today she was in the home and heard a bunch of screaming voices that wasnot main voice she hears telling her to get out of the house she was in danger. Patient reported picking up her keys and running out of the home. Patient reported not feeling safe in her home and she had to get out. Patient expressed feeling paranoid. Patient reported she was taking Thyroid medicine but she wanted a reset because she was frustrated with weight gain. Patient reported stopping her medication 2-3 months ago without consulting her doctor. As reported her doctor is retiring and she was going to wait and get new provider and reset. Patient denied any mental health providers and denied past psychiatric admissions. Patient reported her  had COVID two times and as reported after \"I start acting different but its worst now\". Patient reported she has been trying to find her way. Patient reported for the past 9 years she has watched her grandkids in home  and in August 2021 they started school. Patient is currently unemployed. Patient reported her one year anniversary from nicotine was today but she smoked a cigarette because of how paranoid she was. As reported over the past two days she has only had fluids but overall she has had decreased appetite. Patient reported broken sleep. Patient was cooperative during assessment. Anxious. Thought process linear and goal oriented to get back to normal self. Patient consents for voluntary admission. The patienthas demonstrated mental capacity to provide informed consent. The information is given by the patient, spouse/SO and parent.   The Chief Complaint is increased depression, hallucinations . The Precipitant Factors are medication stopped, unknown factors. Previous Hospitalizations: no  The patient has not previously been in restraints. Current Psychiatrist and/or  is none. Lethality Assessment:    The potential for suicide noted by the following: not noted . The potential for homicide is not noted. The patient has not been a perpetrator of sexual or physical abuse. There are not pending charges. The patient is not felt to be at risk for self harm or harm to others. The attending nurse was advised not noted. Section III - Psychosocial  The patient's overall mood and attitude is low mood, anxious, depressed. Feelings of helplessness and hopelessness are not observed. Generalized anxiety is not observed. Panic is not observed. Phobias are not observed. Obsessive compulsive tendencies are not observed. Section IV - Mental Status Exam  The patient's appearance shows no evidence of impairment. The patient's behavior shows no evidence of impairment. The patient is oriented to time, place, person and situation. The patient's speech shows no evidence of impairment. The patient's mood is depressed and is anxious. The range of affect shows no evidence of impairment. The patient's thought content demonstrates no evidence of impairment. The thought process shows no evidence of impairment. The patient's perception shows no evidence of impairment. The patient's memory shows no evidence of impairment. The patient's appetite shows no evidence of impairment. The patient's sleep has evidence of insomnia. The patient shows little insight. The patient's judgement shows no evidence of impairment. Section V - Substance Abuse  The patient is using substances. The patient is using tobacco by inhalation for greater than 10 years with last use on yesterday. The patient has experienced the following withdrawal symptoms: N/A.       Section VI - Living Arrangements  The patient is . The spouses approximate age is 52's and appears to be in good health. The patient lives with a spouse. The patient has adult children. The patient does plan to return home upon discharge. The patient does not have legal issues pending. The patient's source of income comes from family. Synagogue and cultural practices have not been voiced at this time. The patient's greatest support comes from  and mother and this person will be involved with the treatment. The patient has not been in an event described as horrible or outside the realm of ordinary life experience either currently or in the past.  The patient has not been a victim of sexual/physical abuse. Section VII - Other Areas of Clinical Concern  The highest grade achieved is some college with the overall quality of school experience being described as some college. The patient is currently unemployed and speaks [place] as a primary language. The patient has no communication impairments affecting communication. The patient's preference for learning can be described as: can read and write adequately.   The patient's hearing is normal.  The patient's vision is normal.      Tami Yang

## 2022-05-06 LAB
APPEARANCE UR: CLEAR
BACTERIA URNS QL MICRO: NEGATIVE /HPF
BILIRUB UR QL: NEGATIVE
COLOR UR: NORMAL
EPITH CASTS URNS QL MICRO: NORMAL /LPF
GLUCOSE UR STRIP.AUTO-MCNC: NEGATIVE MG/DL
HGB UR QL STRIP: NEGATIVE
KETONES UR QL STRIP.AUTO: NEGATIVE MG/DL
LEUKOCYTE ESTERASE UR QL STRIP.AUTO: NEGATIVE
NITRITE UR QL STRIP.AUTO: NEGATIVE
PH UR STRIP: 6 [PH] (ref 5–8)
PROT UR STRIP-MCNC: NEGATIVE MG/DL
RBC #/AREA URNS HPF: NORMAL /HPF (ref 0–5)
SP GR UR REFRACTOMETRY: 1.01 (ref 1–1.03)
UA: UC IF INDICATED,UAUC: NORMAL
UROBILINOGEN UR QL STRIP.AUTO: 0.2 EU/DL (ref 0.2–1)
WBC URNS QL MICRO: NORMAL /HPF (ref 0–4)

## 2022-05-06 PROCEDURE — 81001 URINALYSIS AUTO W/SCOPE: CPT

## 2022-05-06 PROCEDURE — 65220000003 HC RM SEMIPRIVATE PSYCH

## 2022-05-06 PROCEDURE — 74011250637 HC RX REV CODE- 250/637: Performed by: PSYCHIATRY & NEUROLOGY

## 2022-05-06 PROCEDURE — 90792 PSYCH DIAG EVAL W/MED SRVCS: CPT | Performed by: PSYCHIATRY & NEUROLOGY

## 2022-05-06 RX ORDER — RISPERIDONE 0.5 MG/1
0.25 TABLET, FILM COATED ORAL DAILY
Status: DISCONTINUED | OUTPATIENT
Start: 2022-05-06 | End: 2022-05-07

## 2022-05-06 RX ORDER — DULOXETIN HYDROCHLORIDE 30 MG/1
60 CAPSULE, DELAYED RELEASE ORAL DAILY
Status: DISCONTINUED | OUTPATIENT
Start: 2022-05-07 | End: 2022-05-11 | Stop reason: HOSPADM

## 2022-05-06 RX ORDER — DULOXETIN HYDROCHLORIDE 30 MG/1
30 CAPSULE, DELAYED RELEASE ORAL
Status: COMPLETED | OUTPATIENT
Start: 2022-05-06 | End: 2022-05-06

## 2022-05-06 RX ADMIN — RISPERIDONE 0.5 MG: 0.5 TABLET ORAL at 18:36

## 2022-05-06 RX ADMIN — DULOXETINE HYDROCHLORIDE 30 MG: 30 CAPSULE, DELAYED RELEASE ORAL at 09:38

## 2022-05-06 RX ADMIN — LEVOTHYROXINE SODIUM 137 MCG: 0.11 TABLET ORAL at 06:36

## 2022-05-06 RX ADMIN — RISPERIDONE 0.25 MG: 0.5 TABLET ORAL at 08:54

## 2022-05-06 NOTE — BH NOTES
Affect constricted, mood depressed/anxious. Alert and Oriented X 4. Cooperative and isolative. Refused to attend group and eat bedtime snack. Pt requested to stay in bed. Interacted with staff when prompted. . Makes needs known. Ate all meals and snacks. + AH Denied SI/HI/VH and pain. No delusional statements made. Medication compliant. Stable with no s/s of distress.  Laid in bed with eyes closed for 7 hours and 45 min      PRN Medication Documentation    Specific patient behavior that led to need for PRN medication: nicotine withdrawal  Staff interventions attempted prior to PRN being given: requested  PRN medication given: nicotine patch 21 mg admin at [phone]  Patient response/effectiveness of PRN medication: tolerated

## 2022-05-06 NOTE — PROGRESS NOTES
05/06/22 [facility] [address] meeting   Attendance Attended   Number of participants 4   Time in 1030   Time out 1100   Total Time 30   MTP problem Depression   Interventions/techniques Supported   Follows directions Followed directions   Interactions Interacted appropriately   Mental Status Anxious;Depressed   Behavior/appearance Cooperative   Suicide Risk Factors SHE STATES THAT SHE HAVE NO THOUGHTS OF HURTING HERSELF   Suicide Protective Factors Denies intent   Goals Achieved Able to give feedback to another;Able to listen to others; Able to engage in interactions   [facility] states that she did not sleep well she states that she has no pain she states that her anxiety an depression is about a 6 she states that her goal is to get better so she can be back to her normal self     Brian Johnson, CNA

## 2022-05-06 NOTE — BH NOTES
Affect blunted. Mood anxious//depressed. Confused at times. Endorses +AH's. \"They call out words or names to me. \"  Attended group activity as active participant when prompted. Denies SI/HI/VH/pain. Cooperative and med compliant. Poor memory recall.

## 2022-05-06 NOTE — BH NOTES
Behavioral Health Interdisciplinary Rounds     Patient Name: Juli Mohamud  Age: 49 y.o. Room/Bed:  234/02  Primary Diagnosis: Severe recurrent major depression with psychotic features ([facility] [address]   Admission Status: Voluntary     Readmission within 30 days: no  Power of  in place: no  Patient requires a blocked bed: no          Reason for blocked bed:     VTE Prophylaxis: Not indicated    Mobility needs/Fall risk: yes  Flu Vaccine : no   Nutritional Plan: no  Consults: no         Labs/Testing due today?: no    Sleep hours: 7.45       Participation in Care/Groups:  yes  Medication Compliant?: Yes  PRNS (last 24 hours): None    Restraints (last 24 hours):  no     CIWA (range last 24 hours):     COWS (range last 24 hours):      Alcohol screening (AUDIT) completed -   AUDIT Score: 0     If applicable, date SBIRT discussed in treatment team AND documented:   AUDIT Screen Score: AUDIT Score: 0      Document Brief Intervention (corresponds directly with the 5 A's, Ask, Advise, Assess, Assist, and Arrange): At- Risk Patients (Score 7-15 for women; 8-15 for men)  Discuss concern patient is drinking at unhealthy levels known to increase risk of alcohol-related health problems. Is Patient ready to commit to change? If No:   Encourage reflection   Discuss short term and long term health risks of consuming alcohol   Barriers to change   Reaffirm willingness to help / Educational materials provided  If Yes:   Set goal  ChipIn provided    Harmful use or Dependence (Score 16 or greater)   Discuss short term and long term health risks of consuming alcohol   Recommendations   Negotiate drinking goal   Recommend addiction specialist/center   Arrange follow-up appointments.     Tobacco - patient is a smoker: Have You Used Tobacco in the Past 30 Days: No  Illegal Drugs use: Have You Used Any Illegal Substances Over the Past 12 Months: No    24 hour chart check complete: yes Patient goal(s) for today: get better to get back to normal self  Treatment team focus/goals: remain safe in hospital  Progress note patient is compliant, she has been quiet interacts when prompted.  She is still reporting AH    LOS:  1  Expected LOS: 3-5    Financial concerns/prescription coverage:  no  Family contact: no      Family requesting physician contact today:  no  Discharge plan: home  Access to weapons : no        Outpatient provider(s): will be referred none at this time  Patient's preferred phone number for follow up call : [phone]    Participating treatment team members: Dr. Jennifer Mcdermott (assigned SW), Nolberto Leonardo

## 2022-05-06 NOTE — H&P
[facility] HISTORY AND PHYSICAL    Name:  Bella Buchanan  MR#:  [id number]  :  1965  ACCOUNT #:  [de-identified]  ADMIT DATE:  2022      [facility]    HISTORY OF PRESENT ILLNESS:  The patient is a 82-year-old  white female who has a past psychiatric history of depression and who was admitted voluntarily from the [facility] Room due to worsening depressive symptoms including some apparent auditory hallucinations over the past 3 months or more. She states that she had been doing at least reasonably well affectively through [facility], but in early January, her  apparently contracted COVID for the second time. She apparently had some emotional struggles during the pandemic being isolated and emotionally withdrawn, but starting in early January, she states her mood began to deteriorate fairly quickly. She started to have some anxiety attacks and even became a little bit of agoraphobic about leaving the house. However, her condition had continued to worsen and she developed auditory hallucinations that had been intensifying over the past 3 months. She states that they have become command hallucinations telling her to \"get out of the house\" and other similar things. She states that mostly the voices are acting as though they are threatening her, and she initially just heard one male voice, but now hears several different ones. She states that what prompted her to reach out for help was the fact that the voices became so loud that they were \"screaming,\" and she became significantly overwhelmed. She denies any other psychotic symptoms such as paranoia, delusional thoughts, delusions of reference, and there is no evidence of any formal thought disorder symptoms. However, her neurovegetative functioning is quite poor.   She is not sleeping well, her appetite is poor, and her concentration is significantly poor.  Her energy level is low, she is very dysphoric and anhedonic, and she has been very withdrawn and isolative. She has increased somatic symptoms, particularly aches and pains, and it has reached a point where she has started to feel somewhat helpless and hopeless, but she is adamantly denying any suicidal thoughts at all. She states she has never made any attempts to try to harm herself in the past.    She denies any other particular stressors, but she did stop her thyroid medication about 3 months ago, and her TSH is significantly elevated at this point, suggesting that she is clinically hypothyroid. She denies any substance use, stating that she drinks alcohol on a limited basis socially. Her urine drug screen was negative. PAST PSYCHIATRIC HISTORY:  She states she has been through depression several times in the past postpartum. She states that she had tried Wellbutrin, possibly another antidepressant, and the last time was put on Cymbalta which worked quite well. She has been off any antidepressants for close to 20 years by her report. PAST MEDICAL HISTORY:  1. History of Graves' disease which was treated and she now has hypothyroidism  2. \"Pre\" diabetes. MEDICATIONS ON ADMISSION:  1. Synthroid 137 mcg daily - - she has been off this for 3 months. 2.  Lipitor, unknown dose - - she has been off this for 3 months. ALLERGIES:  SULFA DRUGS. FAMILY HISTORY:  Her father may have had a history of some depression but she denies any other family psychiatric history. She has one brother and one sister, both of whom are younger. SOCIAL HISTORY:  She has been  for close to 35 years. She has three children who are alive and well. She graduated from high school and had some college. She lives with her  in [place], [place], and she states for the past 9 years, she has essentially had a  job of caring for her four grandchildren.   She did note that they have gone to school either this past fall or winter, and that may also have been a bit of a stressor for her. She has not smoked in the past year. MENTAL STATUS EXAM:  The patient was noted to be a casually dressed, adequately groomed 28-year-old who appeared her stated age. She was fairly pleasant, cooperative with the interview, and only slightly subdued affectively. She endorsed a number of depressive symptoms, however, and clearly has been dealing with some severe depressive issues. She denies any current feelings of hopelessness or any suicidal thinking. There is no history to suggest any amanda or hypomania, but she has had the emergence of the auditory hallucinations for the past 3 months as noted above. However, she was not overtly paranoid and her thoughts were organized and coherent. Her judgment and insight actually appears to be adequate. Cognitive functioning shows no overt deficits other than with concentration and attention span which is notably impaired. DIAGNOSTIC IMPRESSION:  AXIS I:  Major depression, recurrent, severe with psychosis (F33.3). AXIS II:  Deferred. AXIS III:  Graves' disease with resulting hypothyroidism; prediabetes. AXIS IV:  Stressors are mild-to-moderate. AXIS V:  Global Assessment of Functioning currently is 40-45. PLAN:  1. We will admit the patient for further supportive treatment, close observation, increased structure, and involvement within the groups and milieu. 2.  We will restart Cymbalta having to take 30 mg now and then 60 mg daily starting tomorrow. 3.  I have also started risperidone at 0.25 mg in the morning and 0.5 mg at night to help with the psychotic symptoms. 4.  We will use trazodone and Vistaril on a p.r.n. basis for sleep and anxiety respectively. 5.  Estimated length of stay would likely be on the order of 4-5 days depending upon her willingness to stay and her response to treatment.     I certify that this patient's inpatient psychiatric hospital admission is medically necessary for treatment which could reasonably be expected to improve her condition or for diagnostic study. The inpatient psychiatric services are provided while she is under the care of a physician and are included in the individualized plan of care.       Anna Brasher MD      [id number]  D:  05/06/2022 10:29  T:  05/06/2022 11:19  JOB #:  [id number]

## 2022-05-06 NOTE — GROUP NOTE
STEVIE  GROUP DOCUMENTATION INDIVIDUAL                                                                          Group Therapy Note    Date: 5/6/2022    Group Start Time: 0900  Group End Time: 3329  Group Topic: Process Group - Inpatient    [address], [address] [place] [facility] GROUP DOCUMENTATION GROUP    Group Therapy Note    Attendees: Focus of session was on information about labeling and identifying emotions from the book The Mindful Path to Self Compassion by Cherylynn Lombard. We discussed how it can be a positive skill to use daily since labeling emotions and sensations can keep you grounded in your body and out of our ruminating mind.   I shared with group the idea of noting an emotion which means to turn toward an inner experience with awareness. We can also label it with a word. I shared that we can also turn away from the difficult inner experience and turn towards an [address] such as breathing. We spoke about how anchoring can help with bringing some calm and peace of mind and then give us the opportunity to do something positive and effective about what we feel. Attendance: Attended    Patient's Goal:       Demonstrates effective anxiety reduction strategies               Interventions/techniques: Informed, Validated, Promoted peer support and Provide feedback    Follows Directions: Followed directions    Interactions: Interacted appropriately    Mental Status: Calm, Congruent, Preoccupied and Worried    Behavior/appearance: Attentive, Cooperative, Neatly groomed and Needed prompting    Goals Achieved: Able to engage in interactions, Able to listen to others, Able to receive feedback, Discussed coping, Identified feelings and Identified triggers      Additional Notes:  Addy participated in group well with prompting. She complained of 'feeling out of it and confused. \"  She wanted to leave group but I encouraged her to stay since it was the only psychotherapy group today.  She was able to stay but kept saying she could not really concentrate.       Singh Francisco

## 2022-05-06 NOTE — PROGRESS NOTES
Problem: Anxiety-Behavioral Health (Adult/Pediatric)  Goal: *STG: Participates in treatment plan  Note: Affect blunted. Mood anxious//depressed. Confused at times. Endorses +AH's. \"They call out words or names to me. \"  Attended group activity as active participant when prompted. Denies SI/HI/VH/pain. Cooperative and med compliant. Poor memory recall.

## 2022-05-06 NOTE — BH NOTES
Shift change report given to Siri Hobbs re: SBAR, medications, and behavior.   [facility] fall risk score 1

## 2022-05-06 NOTE — PROGRESS NOTES
05/05/22 2027   Group Therapy   Group Community meeting   Attendance Did not attend   Number of participants 4   Time in 2000   Time out 2015   Total Time 15   MTP problem Depression   Interventions/techniques Informed

## 2022-05-07 LAB
CHOLEST SERPL-MCNC: 454 MG/DL
EST. AVERAGE GLUCOSE BLD GHB EST-MCNC: 131 MG/DL
HBA1C MFR BLD: 6.2 % (ref 4–5.6)
HDLC SERPL-MCNC: 55 MG/DL
HDLC SERPL: 8.3 {RATIO} (ref 0–5)
LDLC SERPL CALC-MCNC: 354.2 MG/DL (ref 0–100)
TRIGL SERPL-MCNC: 224 MG/DL (ref ?–150)
VLDLC SERPL CALC-MCNC: 44.8 MG/DL

## 2022-05-07 PROCEDURE — 74011250637 HC RX REV CODE- 250/637: Performed by: PSYCHIATRY & NEUROLOGY

## 2022-05-07 PROCEDURE — 99232 SBSQ HOSP IP/OBS MODERATE 35: CPT | Performed by: PSYCHIATRY & NEUROLOGY

## 2022-05-07 PROCEDURE — 65220000003 HC RM SEMIPRIVATE PSYCH

## 2022-05-07 PROCEDURE — 83036 HEMOGLOBIN GLYCOSYLATED A1C: CPT

## 2022-05-07 PROCEDURE — 36415 COLL VENOUS BLD VENIPUNCTURE: CPT

## 2022-05-07 PROCEDURE — 90833 PSYTX W PT W E/M 30 MIN: CPT | Performed by: PSYCHIATRY & NEUROLOGY

## 2022-05-07 PROCEDURE — 80061 LIPID PANEL: CPT

## 2022-05-07 RX ORDER — RISPERIDONE 1 MG/1
1 TABLET, FILM COATED ORAL
Status: DISCONTINUED | OUTPATIENT
Start: 2022-05-07 | End: 2022-05-10

## 2022-05-07 RX ADMIN — RISPERIDONE 1 MG: 1 TABLET ORAL at 21:04

## 2022-05-07 RX ADMIN — LEVOTHYROXINE SODIUM 137 MCG: 0.11 TABLET ORAL at 07:21

## 2022-05-07 RX ADMIN — RISPERIDONE 0.25 MG: 0.5 TABLET ORAL at 08:34

## 2022-05-07 RX ADMIN — DULOXETINE HYDROCHLORIDE 60 MG: 30 CAPSULE, DELAYED RELEASE ORAL at 08:33

## 2022-05-07 NOTE — PROGRESS NOTES
Patient Goal: To attend groups. Patient appetite was good, physical pain lower legs (B) level 5, no suicidal thoughts. Patient stated that she does have depression level 4 and anxiety level 8   05/07/22 1109   2000 [facility] meeting   Attendance Attended   Number of participants 4   Time in 1030   Time out 1100   Total Time 30   MTP problem Depression   Interventions/techniques Provided feedback   Follows directions Followed directions   Interactions Interacted appropriately   Mental Status Depressed;Flat   Behavior/appearance Cooperative   Suicide Risk Factors No suicidal thoughts   Goals Achieved Able to receive feedback; Able to listen to others

## 2022-05-07 NOTE — ROUTINE PROCESS
Shift change report given to Paula Pettit re: ANTONI. Medications, and behavior.   [facility] Fall Risk Score (2)

## 2022-05-07 NOTE — BH NOTES
Affect blunted. Mood anxious/depressed. Alert and oriented x 2 with confused periods. Endorses + AH's of short words. Interacts little with staff and peers but able to ask for what she needs. Denies SI/HI/VH/pain. Spoke to  on phone. Ate well. Attended group activity as active participant. Med compliant.

## 2022-05-07 NOTE — ROUTINE PROCESS
Shift change report given to [place] Rn, re: SBAR. Medications, and behavior.   [facility] Fall Risk Score (2)

## 2022-05-07 NOTE — PROGRESS NOTES
05/06/22 2054 2000 [facility] meeting   Attendance Did not attend   Number of participants 4   Time in 0800   Time out 0830   Total Time 30   Erica did not come to group because she was taking a shower       Karen Alston CNA

## 2022-05-07 NOTE — BH NOTES
Affect blunted, mood anxious. Pt did not attend or participate in scheduled group activities. Pt was minimally social with staff and peers but did answer assessment questions appropriately. Pt vital signs stable with no complaints of pain. Pt did say that she was still feeling groggy from her medications that she took earlier today. Pt ate 100% of snack. Pt denies SI/HI/AVH. Pt has no scheduled medications for evening/night and did not request a PRN. Pt is in no apparent distress at this time and made no delusional statements. Pt rested in her bed with her eyes closed for 7 hours.

## 2022-05-07 NOTE — PROGRESS NOTES
Problem: Anxiety-Behavioral Health (Adult/Pediatric)  Goal: *STG: Remains safe in hospital  Outcome: Progressing Towards Goal  Goal: *STG: Attends activities and groups  Outcome: Progressing Towards Goal  Goal: *STG/LTG: Complies with medication therapy  Outcome: Progressing Towards Goal     Problem: Falls - Risk of  Goal: *Absence of Falls  Description: Document Udke Fall Risk and appropriate interventions in the flowsheet. Outcome: Progressing Towards Goal  Note: Fall Risk Interventions:       Mentation Interventions: Door open when patient unattended    Medication Interventions: Teach patient to arise slowly    Elimination Interventions:  Toilet paper/wipes in reach

## 2022-05-07 NOTE — PROGRESS NOTES
Problem: Anxiety-Behavioral Health (Adult/Pediatric)  Goal: *STG/LTG: Complies with medication therapy  Outcome: Progressing Towards Goal  Note: Affect blunted. Mood anxious/depressed. Alert and oriented x 2 with confused periods. Endorses + AH's of short words. Interacts little with staff and peers but able to ask for what she needs. Denies SI/HI/VH/pain. Spoke to  on phone. Ate well. Attended group activity as active participant. Med compliant.

## 2022-05-08 PROCEDURE — 65220000003 HC RM SEMIPRIVATE PSYCH

## 2022-05-08 PROCEDURE — 99232 SBSQ HOSP IP/OBS MODERATE 35: CPT | Performed by: PSYCHIATRY & NEUROLOGY

## 2022-05-08 PROCEDURE — 74011250637 HC RX REV CODE- 250/637: Performed by: PSYCHIATRY & NEUROLOGY

## 2022-05-08 RX ORDER — ATORVASTATIN CALCIUM 40 MG/1
40 TABLET, FILM COATED ORAL
Status: DISCONTINUED | OUTPATIENT
Start: 2022-05-09 | End: 2022-05-11 | Stop reason: HOSPADM

## 2022-05-08 RX ADMIN — LEVOTHYROXINE SODIUM 137 MCG: 0.11 TABLET ORAL at 06:31

## 2022-05-08 RX ADMIN — RISPERIDONE 1 MG: 1 TABLET ORAL at 21:14

## 2022-05-08 RX ADMIN — DULOXETINE HYDROCHLORIDE 60 MG: 30 CAPSULE, DELAYED RELEASE ORAL at 08:15

## 2022-05-08 NOTE — BH NOTES
Affect blunted, mood flat. Pt attended and participated in scheduled group activities. Pt was minimally social with staff and peers, but appropriate. Pt vital signs stable with no complaints of pain. Pt denies SI/HI/VH but stated that she does still hear voices at times. Pt was compliant with medications and did not request a PRN. Pt is in no apparent distress at this time and made no delusional statements. Pt rested in her bed with her eyes closed for 7 hours.

## 2022-05-08 NOTE — BH NOTES
PSYCHIATRIC PROGRESS NOTE         Patient Name  Sandy Frances   Date of Birth 1965   [facility] [id number]   Medical Record Number  [id number]      Age  49 y.o. PCP Jacob Saldana MD   Admit date:  5/5/2022    Room Number  234/02  @ [facility]   Date of Service  5/8/2022         E & M PROGRESS NOTE:         HISTORY       CC:  \"AH\"  HISTORY OF PRESENT ILLNESS/INTERVAL HISTORY:  (reviewed/updated 5/8/2022). per initial evaluation: the patient is a 62-year-old  white female who has a past psychiatric history of depression and who was admitted voluntarily from the [facility] Emergency Room due to worsening depressive symptoms including some apparent auditory hallucinations over the past 3 months or more.     She states that she had been doing at least reasonably well affectively through [place], but in early January, her  apparently contracted COVID for the second time. She apparently had some emotional struggles during the pandemic being isolated and emotionally withdrawn, but starting in early January, she states her mood began to deteriorate fairly quickly. She started to have some anxiety attacks and even became a little bit of agoraphobic about leaving the house. However, her condition had continued to worsen and she developed auditory hallucinations that had been intensifying over the past 3 months. She states that they have become command hallucinations telling her to \"get out of the house\" and other similar things. She states that mostly the voices are acting as though they are threatening her, and she initially just heard one male voice, but now hears several different ones.   She states that what prompted her to reach out for help was the fact that the voices became so loud that they were \"screaming,\" and she became significantly overwhelmed.     She denies any other psychotic symptoms such as paranoia, delusional thoughts, delusions of reference, and there is no evidence of any formal thought disorder symptoms. However, her neurovegetative functioning is quite poor. She is not sleeping well, her appetite is poor, and her concentration is significantly poor. Her energy level is low, she is very dysphoric and anhedonic, and she has been very withdrawn and isolative. She has increased somatic symptoms, particularly aches and pains, and it has reached a point where she has started to feel somewhat helpless and hopeless, but she is adamantly denying any suicidal thoughts at all. She states she has never made any attempts to try to harm herself in the past.     She denies any other particular stressors, but she did stop her thyroid medication about 3 months ago, and her TSH is significantly elevated at this point, suggesting that she is clinically hypothyroid. She denies any substance use, stating that she drinks alcohol on a limited basis socially. Her urine drug screen was negative. 05/07 - the patient has been visible, she is medication compliant and able to make her needs known. She endorses AH ongoing, but denies active thoughts of self harm. Patient states she is having significant sedation from the antipsychotics. Patient amenable to further adjustments of his antipsychotics.         SIDE EFFECTS: (reviewed/updated 5/8/2022)  None reported or admitted to. ALLERGIES:(reviewed/updated 5/8/2022)  Allergies   Allergen Reactions    Sulfa (Sulfonamide Antibiotics) Hives      MEDICATIONS PRIOR TO ADMISSION:(reviewed/updated 5/8/2022)  Medications Prior to Admission   Medication Sig    cephALEXin (Keflex) 500 mg capsule Take 1 Capsule by mouth four (4) times daily for 5 days.  levothyroxine (Synthroid) 137 mcg tablet Take 1 Tablet by mouth Daily (before breakfast).  LORazepam (Ativan) 0.5 mg tablet Take 2 Tablets by mouth every eight (8) hours as needed for Anxiety. Max Daily Amount: 3 mg.     levothyroxine sodium (SYNTHROID PO) Take  by mouth.  atorvastatin calcium (ATORVASTATIN PO) Take  by mouth. PAST MEDICAL HISTORY: Past medical history from the initial psychiatric evaluation has been reviewed (reviewed/updated 2022) with no additional updates (I asked patient and no additional past medical history provided). Past Medical History:   Diagnosis Date    Anxiety disorder     Depression     Hypertension     Ill-defined condition     graves disease    Thyroid disease      Past Surgical History:   Procedure Laterality Date    HX GYN      hysterectomy      SOCIAL HISTORY: Social history from the initial psychiatric evaluation has been reviewed (reviewed/updated 2022) with no additional updates (I asked patient and no additional social history provided). Social History     Socioeconomic History    Marital status:      Spouse name: Not on file    Number of children: 1    Years of education: 15    Highest education level: Associate degree: academic program   Occupational History    Not on file   Tobacco Use    Smoking status: Former Smoker     Packs/day: 1.00     Years: 15.00     Pack years: 15.00     Quit date: 5/3/2021     Years since quittin.0    Smokeless tobacco: Never Used    Tobacco comment: Pt quit smoking over 1 year ago. Pt anxious at this time. Vaping Use    Vaping Use: Never used   Substance and Sexual Activity    Alcohol use:  Yes     Alcohol/week: 1.0 - 2.0 standard drink     Types: 1 - 2 Cans of beer per week     Comment: Only drinks socially or special occasions    Drug use: Never    Sexual activity: Yes     Partners: Male     Birth control/protection: Surgical   Other Topics Concern     Service Not Asked    Blood Transfusions Not Asked    Caffeine Concern Not Asked    Occupational Exposure Not Asked    Hobby Hazards Not Asked    Sleep Concern Not Asked    Stress Concern Not Asked    Weight Concern Yes    Special Diet Not Asked    Back Care Not Asked    Exercise Not Asked    Bike Helmet Not Asked   [address] Not Asked    Self-Exams Not Asked   Social History Narrative    Not on file     Social Determinants of Health     Financial Resource Strain:     Difficulty of Paying Living Expenses: Not on file   Food Insecurity:     Worried About Running Out of Food in the Last Year: Not on file    Penny of Food in the Last Year: Not on file   Transportation Needs:     Lack of Transportation (Medical): Not on file    Lack of Transportation (Non-Medical): Not on file   Physical Activity:     Days of Exercise per Week: Not on file    Minutes of Exercise per Session: Not on file   Stress:     Feeling of Stress : Not on file   Social Connections:     Frequency of Communication with Friends and Family: Not on file    Frequency of Social Gatherings with Friends and Family: Not on file    Attends Latter day Services: Not on file    Active Member of [address] [place] [address] [employer] or Organizations: Not on file    Attends Club or Organization Meetings: Not on file    Marital Status: Not on file   Intimate Partner Violence:     Fear of Current or Ex-Partner: Not on file    Emotionally Abused: Not on file    Physically Abused: Not on file    Sexually Abused: Not on file   Housing Stability:     Unable to Pay for Housing in the Last Year: Not on file    Number of [place] in the Last Year: Not on file    Unstable Housing in the Last Year: Not on file      FAMILY HISTORY: Family history from the initial psychiatric evaluation has been reviewed (reviewed/updated 5/8/2022) with no additional updates (I asked patient and no additional family history provided). No family history on file.     REVIEW OF SYSTEMS: (reviewed/updated 5/8/2022)  Appetite:no change from normal   Sleep: improved   All other Review of Systems: Negative except per HPI         [facility] (MSE):    MSE FINDINGS ARE WITHIN NORMAL LIMITS (WNL) UNLESS OTHERWISE STATED BELOW. ( ALL OF THE BELOW CATEGORIES OF THE MSE HAVE BEEN REVIEWED (reviewed 5/8/2022) AND UPDATED AS DEEMED APPROPRIATE )  General Presentation age appropriate, cooperative and guarded   Orientation oriented to time, place and person   Vital Signs  See below (reviewed 5/8/2022); Vital Signs (BP, Pulse, & Temp) are within normal limits if not listed below.    Gait and Station Stable/steady, no ataxia   Musculoskeletal System No extrapyramidal symptoms (EPS); no abnormal muscular movements or Tardive Dyskinesia (TD); muscle strength and tone are within normal limits   Language No aphasia or dysarthria   Speech:  normal volume and non-pressured   Thought Processes logical; normal rate of thoughts; fair abstract reasoning/computation   Thought Associations goal directed   Thought Content auditory hallucinations   Suicidal Ideations none   Homicidal Ideations none   Mood:  anxious  and depressed   Affect:  constricted and mood-congruent   Memory recent  intact   Memory remote:  intact   Concentration/Attention:  intact   Fund of Knowledge average   Insight:  limited   Reliability fair   Judgment:  limited          VITALS:     Patient Vitals for the past 24 hrs:   Temp Pulse Resp BP SpO2   05/07/22 1906 (!) 96.5 °F (35.8 °C) 89 17 111/66 97 %   05/07/22 0803 (!) 96.2 °F (35.7 °C) 85 18 (!) 141/78 96 %     Wt Readings from Last 3 Encounters:   05/04/22 103.1 kg (227 lb 4.7 oz)     Temp Readings from Last 3 Encounters:   05/07/22 (!) 96.5 °F (35.8 °C)   05/05/22 98.3 °F (36.8 °C)   11/30/20 98.4 °F (36.9 °C)     BP Readings from Last 3 Encounters:   05/07/22 111/66   05/05/22 113/69     Pulse Readings from Last 3 Encounters:   05/07/22 89   05/05/22 72   11/30/20 87            DATA     LABORATORY DATA:(reviewed/updated 5/8/2022)  Recent Results (from the past 24 hour(s))   LIPID PANEL    Collection Time: 05/07/22  7:21 AM   Result Value Ref Range    Cholesterol, total 454 (H) <200 MG/DL    Triglyceride 224 (H) <150 MG/DL    HDL Cholesterol 55 MG/DL LDL, calculated 354.2 (H) 0 - 100 MG/DL    VLDL, calculated 44.8 MG/DL    CHOL/HDL Ratio 8.3 (H) 0.0 - 5.0     HEMOGLOBIN A1C WITH EAG    Collection Time: 05/07/22  7:21 AM   Result Value Ref Range    Hemoglobin A1c 6.2 (H) 4.0 - 5.6 %    Est. average glucose 131 mg/dL     No results found for: VALF2, VALAC, VALP, VALPR, DS6, CRBAM, CRBAMP, CARB2, XCRBAM  No results found for: LITHM   RADIOLOGY REPORTS:(reviewed/updated 5/8/2022)  CT HEAD WO CONT    Result Date: 5/4/2022  EXAM: CT HEAD WO CONT INDICATION: AMS, auditory hallucinations COMPARISON: None. CONTRAST: None. TECHNIQUE: Unenhanced CT of the head was performed using 5 mm images. Brain and bone windows were generated. Coronal and sagittal reformats. CT dose reduction was achieved through use of a standardized protocol tailored for this examination and automatic exposure control for dose modulation. FINDINGS: The ventricles and sulci are normal in size, shape and configuration. . There is no significant white matter disease. There is no intracranial hemorrhage, extra-axial collection, or mass effect. The basilar cisterns are open. No CT evidence of acute infarct. The bone windows demonstrate no abnormalities. The visualized portions of the paranasal sinuses and mastoid air cells are clear. No acute findings. DUPLEX LOWER EXT VENOUS BILAT    Result Date: 5/4/2022  · No evidence of acute deep vein thrombosis in the right common femoral, femoral, popliteal, posterior tibial, and peroneal veins. The veins were imaged in the transverse and longitudinal planes. The vessels showed normal color filling and compressibility. Doppler interrogation showed phasic and spontaneous flow. · No evidence of deep vein thrombosis in the right lower extremity. · No evidence of acute deep vein thrombosis in the left common femoral, femoral, popliteal, posterior tibial, and peroneal veins. The veins were imaged in the transverse and longitudinal planes.  The vessels showed normal color filling and compressibility. Doppler interrogation showed phasic and spontaneous flow. · No evidence of deep vein thrombosis in the left lower extremity. MEDICATIONS     ALL MEDICATIONS:   Current Facility-Administered Medications   Medication Dose Route Frequency    risperiDONE (RisperDAL) tablet 1 mg  1 mg Oral QHS    DULoxetine (CYMBALTA) capsule 60 mg  60 mg Oral DAILY    hydrOXYzine pamoate (VISTARIL) capsule 50 mg  50 mg Oral TID PRN    traZODone (DESYREL) tablet 50 mg  50 mg Oral QHS PRN    alum-mag hydroxide-simeth (MYLANTA) oral suspension 30 mL  30 mL Oral Q4H PRN    magnesium hydroxide (MILK OF MAGNESIA) 400 mg/5 mL oral suspension 30 mL  30 mL Oral DAILY PRN    acetaminophen (TYLENOL) tablet 1,000 mg  1,000 mg Oral Q6H PRN    ibuprofen (MOTRIN) tablet 400 mg  400 mg Oral Q6H PRN    levothyroxine (SYNTHROID) tablet 137 mcg  137 mcg Oral ACB    nicotine (NICODERM CQ) 21 mg/24 hr patch 1 Patch  1 Patch TransDERmal DAILY PRN      SCHEDULED MEDICATIONS:   Current Facility-Administered Medications   Medication Dose Route Frequency    risperiDONE (RisperDAL) tablet 1 mg  1 mg Oral QHS    DULoxetine (CYMBALTA) capsule 60 mg  60 mg Oral DAILY    levothyroxine (SYNTHROID) tablet 137 mcg  137 mcg Oral ACB          ASSESSMENT & PLAN     DIAGNOSES REQUIRING ACTIVE TREATMENT AND MONITORING: (reviewed/updated 5/8/2022)  Patient Active Hospital Problem List:   Severe recurrent major depression with psychotic features ([facility] [address] (5/5/2022)    Assessment: patient with ongoing symptoms of depression as well as AH; treating symptomatically.   Plan:  PLAN:  - INCREASE Cymbalta to 60 mg QDAY for MDD  - CHANGE and INCREASE Risperdal to 1 mg QHS for psychosis, MDD adjunct  - IGM therapy as tolerated  - Dispo (home when stable)    In summary, Lucina Prado, is a 49 y.o.  female who presents with a severe exacerbation of the principal diagnosis of Severe recurrent major depression with psychotic [facility])    Patient's condition is improving. Patient requires continued inpatient hospitalization for further stabilization, safety monitoring and medication management. I will continue to coordinate the provision of individual, milieu, occupational, group, and substance abuse therapies to address target symptoms/diagnoses as deemed appropriate for the individual patient. A coordinated, multidisplinary treatment team round was conducted with the patient (this team consists of the nurse, psychiatric unit pharmacist,  and writer). Complete current electronic health record for patient has been reviewed today including consultant notes, ancillary staff notes, nurses and psychiatric tech notes. Suicide risk assessment completed and patient deemed to be of low risk for suicide at this time. The following regarding medications was addressed during rounds with patient:   the risks and benefits of the proposed medication. The patient was given the opportunity to ask questions. Informed consent given to the use of the above medications. Will continue to adjust psychiatric and non-psychiatric medications (see above \"medication\" section and orders section for details) as deemed appropriate & based upon diagnoses and response to treatment. I will continue to order blood tests/labs and diagnostic tests as deemed appropriate and review results as they become available (see orders for details and above listed lab/test results). I will order psychiatric records from previous [facility] to further elucidate the nature of patient's psychopathology and review once available. I will gather additional collateral information from friends, family and o/p treatment team to further elucidate the nature of patient's psychopathology and baselline level of psychiatric functioning.          I certify that this patient's inpatient psychiatric hospital services furnished since the previous certification were, and continue to be, required for treatment that could reasonably be expected to improve the patient's condition, or for diagnostic study, and that the patient continues to need, on a daily basis, active treatment furnished directly by or requiring the supervision of inpatient psychiatric facility personnel. In addition, the hospital records show that services furnished were intensive treatment services, admission or related services, or equivalent services.     EXPECTED DISCHARGE DATE/DAY: TBD     DISPOSITION: Home       Signed By:   Sancho Crabtree MD  5/8/2022

## 2022-05-08 NOTE — BH NOTES
Has been cigarette-free for one year and feels tempted so is wearing a Nicotine patch. She states this helps. Affect blunted. Mood anxious/depressed. Denies SI/HI/AVH/pain. Stares while sitting up on side of bed. Stays out among others, but usually is quiet. Thoughts still confused and endorses AH's which are usually small phrases or names being called (per pt).

## 2022-05-08 NOTE — PROGRESS NOTES
[facility]   Attendance Attended   Number of participants 4   Time in 2000   Time out 2015   Total Time 15   MTP problem Depression   Interventions/techniques Informed   Follows directions Followed directions   Interactions Interacted appropriately   Mental Status Depressed   Behavior/appearance Attentive   Suicide Risk Factors no thoughts of hurting herself nor others

## 2022-05-08 NOTE — PROGRESS NOTES
Problem: Anxiety-Behavioral Health (Adult/Pediatric)  Goal: *STG: Remains safe in hospital  Outcome: Progressing Towards Goal  Goal: *STG: Attends activities and groups  Outcome: Progressing Towards Goal  Goal: *STG/LTG: Complies with medication therapy  Outcome: Progressing Towards Goal     Problem: Falls - Risk of  Goal: *Absence of Falls  Description: Document Duke Fall Risk and appropriate interventions in the flowsheet. Outcome: Progressing Towards Goal  Note: Fall Risk Interventions:       Mentation Interventions: Door open when patient unattended    Medication Interventions: Teach patient to arise slowly    Elimination Interventions:  Toilet paper/wipes in reach              Problem: Hypertension  Goal: *Blood pressure within specified parameters  Outcome: Progressing Towards Goal

## 2022-05-08 NOTE — BH NOTES
Pt did not attend community meeting was resting during this time. Pt was encouraged to attend the next group session.

## 2022-05-08 NOTE — ROUTINE PROCESS
Shift change report given to Daniela Wiley Rn, re: SBAR. Medications, and behavior.   Duke Fall Risk Score (2)

## 2022-05-08 NOTE — BH NOTES
PSYCHOTHERAPY SESSION NOTE     Patient Name  Courtney Yusuf   Date of Birth 1965   [facility] [id number]   Medical Record Number  [id number]      Age  49 y.o. PCP Seb Freeman MD   Admit date:  5/5/2022    Room Number  234/02  @ [facility]   Date of Service  5/8/2022            Length of psychotherapy session: 30 minutes    Main condition/diagnosis/issues treated during session today, 5/8/2022 : MDD with psychosis    I employed Cognitive Behavioral therapy techniques, Reality-Oriented psychotherapy, as well as supportive psychotherapy in regards to various ongoing psychosocial stressors, including the following: pre-admission and current problems; housing issues; occupational issues; medical issues; and stress of hospitalization. Interpersonal relationship issues and psychodynamic conflicts explored. Attempts made to alleviate maladaptive patterns. Overall, patient is progressing. Treatment Plan Update (reviewed and updated 5/8/2022) : I will modify psychotherapy tx plan by implementing more stress management strategies, building upon cognitive behavioral techniques, increasing coping skills, as well as shoring up psychological defenses).

## 2022-05-08 NOTE — PROGRESS NOTES
Affect blunted. Mood anxious/depressed. Denies SI/HI/AVH/pain. Stares while sitting up on side of bed. Stays out among others, but usually is quiet. Thoughts still confused and endorses AH's which are usually small phrases or names being called (per pt). Did not attend group activity. Dozed in chair much of day. No SE's from meds and felt the Risperdal at night helped her sleep and not feel so sleepy in the daytime. Ate all of meals.

## 2022-05-09 PROBLEM — F32.9 MAJOR DEPRESSIVE DISORDER: Status: ACTIVE | Noted: 2022-05-09

## 2022-05-09 PROCEDURE — 74011250637 HC RX REV CODE- 250/637: Performed by: PSYCHIATRY & NEUROLOGY

## 2022-05-09 PROCEDURE — 99232 SBSQ HOSP IP/OBS MODERATE 35: CPT | Performed by: PSYCHIATRY & NEUROLOGY

## 2022-05-09 PROCEDURE — 65220000003 HC RM SEMIPRIVATE PSYCH

## 2022-05-09 RX ADMIN — IBUPROFEN 400 MG: 400 TABLET, FILM COATED ORAL at 08:45

## 2022-05-09 RX ADMIN — RISPERIDONE 1 MG: 1 TABLET ORAL at 21:19

## 2022-05-09 RX ADMIN — DULOXETINE HYDROCHLORIDE 60 MG: 30 CAPSULE, DELAYED RELEASE ORAL at 08:45

## 2022-05-09 RX ADMIN — ATORVASTATIN CALCIUM 40 MG: 40 TABLET, FILM COATED ORAL at 21:19

## 2022-05-09 RX ADMIN — LEVOTHYROXINE SODIUM 137 MCG: 0.11 TABLET ORAL at 06:31

## 2022-05-09 NOTE — BH NOTES
PSYCHIATRIC PROGRESS NOTE         Patient Name  Carole Martinez   Date of Birth 1965   [facility] [id number]   Medical Record Number  [id number]      Age  49 y.o. PCP Shantelle Milner MD   Admit date:  5/5/2022    Room Number  234/02  @ 1900 [facility]   Date of Service  5/8/2022         E & M PROGRESS NOTE:         HISTORY       CC:  \"AH\"  HISTORY OF PRESENT ILLNESS/INTERVAL HISTORY:  (reviewed/updated 5/8/2022). per initial evaluation: the patient is a 82-year-old  white female who has a past psychiatric history of depression and who was admitted voluntarily from the [facility] Emergency Room due to worsening depressive symptoms including some apparent auditory hallucinations over the past 3 months or more.     She states that she had been doing at least reasonably well affectively through [facility], but in early January, her  apparently contracted COVID for the second time. She apparently had some emotional struggles during the pandemic being isolated and emotionally withdrawn, but starting in early January, she states her mood began to deteriorate fairly quickly. She started to have some anxiety attacks and even became a little bit of agoraphobic about leaving the house. However, her condition had continued to worsen and she developed auditory hallucinations that had been intensifying over the past 3 months. She states that they have become command hallucinations telling her to \"get out of the house\" and other similar things. She states that mostly the voices are acting as though they are threatening her, and she initially just heard one male voice, but now hears several different ones.   She states that what prompted her to reach out for help was the fact that the voices became so loud that they were \"screaming,\" and she became significantly overwhelmed.     She denies any other psychotic symptoms such as paranoia, delusional thoughts, delusions of reference, and there is no evidence of any formal thought disorder symptoms. However, her neurovegetative functioning is quite poor. She is not sleeping well, her appetite is poor, and her concentration is significantly poor. Her energy level is low, she is very dysphoric and anhedonic, and she has been very withdrawn and isolative. She has increased somatic symptoms, particularly aches and pains, and it has reached a point where she has started to feel somewhat helpless and hopeless, but she is adamantly denying any suicidal thoughts at all. She states she has never made any attempts to try to harm herself in the past.     She denies any other particular stressors, but she did stop her thyroid medication about 3 months ago, and her TSH is significantly elevated at this point, suggesting that she is clinically hypothyroid. She denies any substance use, stating that she drinks alcohol on a limited basis socially. Her urine drug screen was negative. 05/07 - the patient has been visible, she is medication compliant and able to make her needs known. She endorses AH ongoing, but denies active thoughts of self harm. Patient states she is having significant sedation from the antipsychotics. Patient amenable to further adjustments of his antipsychotics.     05/08 - the patient reports that her thoughts are 'clearer' this morning though she continues to report AH. She is noted to have slow speech per nurse report but is not somnolent this AM compared to yesterday. She states she slept better on interview and is doing well with the increased dose of Risperdal. Patient with some bizarre statements and confusion overnight per nurse report. SIDE EFFECTS: (reviewed/updated 5/8/2022)  None reported or admitted to.      ALLERGIES:(reviewed/updated 5/8/2022)  Allergies   Allergen Reactions    Sulfa (Sulfonamide Antibiotics) Hives      MEDICATIONS PRIOR TO ADMISSION:(reviewed/updated 5/8/2022)  Medications Prior to Admission   Medication Sig    cephALEXin (Keflex) 500 mg capsule Take 1 Capsule by mouth four (4) times daily for 5 days.  levothyroxine (Synthroid) 137 mcg tablet Take 1 Tablet by mouth Daily (before breakfast).  LORazepam (Ativan) 0.5 mg tablet Take 2 Tablets by mouth every eight (8) hours as needed for Anxiety. Max Daily Amount: 3 mg.  levothyroxine sodium (SYNTHROID PO) Take  by mouth.  atorvastatin calcium (ATORVASTATIN PO) Take  by mouth. PAST MEDICAL HISTORY: Past medical history from the initial psychiatric evaluation has been reviewed (reviewed/updated 2022) with no additional updates (I asked patient and no additional past medical history provided). Past Medical History:   Diagnosis Date    Anxiety disorder     Depression     Hypertension     Ill-defined condition     graves disease    Thyroid disease      Past Surgical History:   Procedure Laterality Date    HX GYN      hysterectomy      SOCIAL HISTORY: Social history from the initial psychiatric evaluation has been reviewed (reviewed/updated 2022) with no additional updates (I asked patient and no additional social history provided). Social History     Socioeconomic History    Marital status:      Spouse name: Not on file    Number of children: 1    Years of education: 15    Highest education level: Associate degree: academic program   Occupational History    Not on file   Tobacco Use    Smoking status: Former Smoker     Packs/day: 1.00     Years: 15.00     Pack years: 15.00     Quit date: 5/3/2021     Years since quittin.0    Smokeless tobacco: Never Used    Tobacco comment: Pt quit smoking over 1 year ago. Pt anxious at this time. Vaping Use    Vaping Use: Never used   Substance and Sexual Activity    Alcohol use:  Yes     Alcohol/week: 1.0 - 2.0 standard drink     Types: 1 - 2 Cans of beer per week     Comment: Only drinks socially or special occasions    Drug use: Never    Sexual activity: Yes     Partners: Male     Birth control/protection: Surgical   Other Topics Concern     Service Not Asked    Blood Transfusions Not Asked    Caffeine Concern Not Asked    Occupational Exposure Not Asked    Hobby Hazards Not Asked    Sleep Concern Not Asked    Stress Concern Not Asked    Weight Concern Yes    Special Diet Not Asked    Back Care Not Asked    Exercise Not Asked    Bike Helmet Not Asked   [address] Not Asked    Self-Exams Not Asked   Social History Narrative    Not on file     Social Determinants of Health     Financial Resource Strain:     Difficulty of Paying Living Expenses: Not on file   Food Insecurity:     Worried About Running Out of Food in the Last Year: Not on file    Penny of Food in the Last Year: Not on file   Transportation Needs:     Lack of Transportation (Medical): Not on file    Lack of Transportation (Non-Medical):  Not on file   Physical Activity:     Days of Exercise per Week: Not on file    Minutes of Exercise per Session: Not on file   Stress:     Feeling of Stress : Not on file   Social Connections:     Frequency of Communication with Friends and Family: Not on file    Frequency of Social Gatherings with Friends and Family: Not on file    Attends Congregational Services: Not on file    Active Member of [address] [place] [address] [employer] or Organizations: Not on file    Attends Club or Organization Meetings: Not on file    Marital Status: Not on file   Intimate Partner Violence:     Fear of Current or Ex-Partner: Not on file    Emotionally Abused: Not on file    Physically Abused: Not on file    Sexually Abused: Not on file   Housing Stability:     Unable to Pay for Housing in the Last Year: Not on file    Number of [place] in the Last Year: Not on file    Unstable Housing in the Last Year: Not on file      FAMILY HISTORY: Family history from the initial psychiatric evaluation has been reviewed (reviewed/updated 5/8/2022) with no additional updates (I asked patient and no additional family history provided). No family history on file. REVIEW OF SYSTEMS: (reviewed/updated 5/8/2022)  Appetite:no change from normal   Sleep: improved   All other Review of Systems: Negative except per HPI         [facility] (MSE):    MSE FINDINGS ARE WITHIN NORMAL LIMITS (WNL) UNLESS OTHERWISE STATED BELOW. ( ALL OF THE BELOW CATEGORIES OF THE MSE HAVE BEEN REVIEWED (reviewed 5/8/2022) AND UPDATED AS DEEMED APPROPRIATE )  General Presentation age appropriate, cooperative and guarded   Orientation oriented to time, place and person   Vital Signs  See below (reviewed 5/8/2022); Vital Signs (BP, Pulse, & Temp) are within normal limits if not listed below.    Gait and Station Stable/steady, no ataxia   Musculoskeletal System No extrapyramidal symptoms (EPS); no abnormal muscular movements or Tardive Dyskinesia (TD); muscle strength and tone are within normal limits   Language No aphasia or dysarthria   Speech:  normal volume and non-pressured   Thought Processes logical; normal rate of thoughts; fair abstract reasoning/computation   Thought Associations goal directed   Thought Content auditory hallucinations   Suicidal Ideations none   Homicidal Ideations none   Mood:  anxious  and depressed   Affect:  constricted and mood-congruent   Memory recent  intact   Memory remote:  intact   Concentration/Attention:  intact   Fund of Knowledge average   Insight:  limited   Reliability fair   Judgment:  limited          VITALS:     Patient Vitals for the past 24 hrs:   Temp Pulse Resp BP SpO2   05/08/22 1939 (!) 96.2 °F (35.7 °C) 71 18 (!) 142/80 92 %   05/08/22 0726 96.9 °F (36.1 °C) 72 18 125/73 95 %     Wt Readings from Last 3 Encounters:   05/04/22 103.1 kg (227 lb 4.7 oz)     Temp Readings from Last 3 Encounters:   05/08/22 (!) 96.2 °F (35.7 °C)   05/05/22 98.3 °F (36.8 °C)   11/30/20 98.4 °F (36.9 °C)     BP Readings from Last 3 Encounters:   05/08/22 (!) 142/80   05/05/22 113/69     Pulse Readings from Last 3 Encounters:   05/08/22 71   05/05/22 72   11/30/20 87            DATA     LABORATORY DATA:(reviewed/updated 5/8/2022)  No results found for this or any previous visit (from the past 24 hour(s)). No results found for: VALF2, VALAC, VALP, VALPR, DS6, CRBAM, CRBAMP, CARB2, XCRBAM  No results found for: LITHM   RADIOLOGY REPORTS:(reviewed/updated 5/8/2022)  CT HEAD WO CONT    Result Date: 5/4/2022  EXAM: CT HEAD WO CONT INDICATION: AMS, auditory hallucinations COMPARISON: None. CONTRAST: None. TECHNIQUE: Unenhanced CT of the head was performed using 5 mm images. Brain and bone windows were generated. Coronal and sagittal reformats. CT dose reduction was achieved through use of a standardized protocol tailored for this examination and automatic exposure control for dose modulation. FINDINGS: The ventricles and sulci are normal in size, shape and configuration. . There is no significant white matter disease. There is no intracranial hemorrhage, extra-axial collection, or mass effect. The basilar cisterns are open. No CT evidence of acute infarct. The bone windows demonstrate no abnormalities. The visualized portions of the paranasal sinuses and mastoid air cells are clear. No acute findings. DUPLEX LOWER EXT VENOUS BILAT    Result Date: 5/4/2022  · No evidence of acute deep vein thrombosis in the right common femoral, femoral, popliteal, posterior tibial, and peroneal veins. The veins were imaged in the transverse and longitudinal planes. The vessels showed normal color filling and compressibility. Doppler interrogation showed phasic and spontaneous flow. · No evidence of deep vein thrombosis in the right lower extremity. · No evidence of acute deep vein thrombosis in the left common femoral, femoral, popliteal, posterior tibial, and peroneal veins. The veins were imaged in the transverse and longitudinal planes.  The vessels showed normal color filling and compressibility. Doppler interrogation showed phasic and spontaneous flow. · No evidence of deep vein thrombosis in the left lower extremity. MEDICATIONS     ALL MEDICATIONS:   Current Facility-Administered Medications   Medication Dose Route Frequency    [START ON 5/9/2022] atorvastatin (LIPITOR) tablet 40 mg  40 mg Oral QHS    risperiDONE (RisperDAL) tablet 1 mg  1 mg Oral QHS    DULoxetine (CYMBALTA) capsule 60 mg  60 mg Oral DAILY    hydrOXYzine pamoate (VISTARIL) capsule 50 mg  50 mg Oral TID PRN    traZODone (DESYREL) tablet 50 mg  50 mg Oral QHS PRN    alum-mag hydroxide-simeth (MYLANTA) oral suspension 30 mL  30 mL Oral Q4H PRN    magnesium hydroxide (MILK OF MAGNESIA) 400 mg/5 mL oral suspension 30 mL  30 mL Oral DAILY PRN    acetaminophen (TYLENOL) tablet 1,000 mg  1,000 mg Oral Q6H PRN    ibuprofen (MOTRIN) tablet 400 mg  400 mg Oral Q6H PRN    levothyroxine (SYNTHROID) tablet 137 mcg  137 mcg Oral ACB    nicotine (NICODERM CQ) 21 mg/24 hr patch 1 Patch  1 Patch TransDERmal DAILY PRN      SCHEDULED MEDICATIONS:   Current Facility-Administered Medications   Medication Dose Route Frequency    [START ON 5/9/2022] atorvastatin (LIPITOR) tablet 40 mg  40 mg Oral QHS    risperiDONE (RisperDAL) tablet 1 mg  1 mg Oral QHS    DULoxetine (CYMBALTA) capsule 60 mg  60 mg Oral DAILY    levothyroxine (SYNTHROID) tablet 137 mcg  137 mcg Oral ACB          ASSESSMENT & PLAN     DIAGNOSES REQUIRING ACTIVE TREATMENT AND MONITORING: (reviewed/updated 5/8/2022)  Patient Active Hospital Problem List:   Severe recurrent major depression with psychotic features ([facility] [address] (5/5/2022)    Assessment: patient with ongoing symptoms of depression as well as AH; treating symptomatically.   Plan:  PLAN:  - CONTINUE Cymbalta 60 mg QDAY for MDD  - CONTINUE Risperdal 1 mg QHS for psychosis, MDD adjunct  - IGM therapy as tolerated  - Dispo (home when stable)    In summary, Pedro Delgado, is a 49 y.o.  female who presents with a severe exacerbation of the principal diagnosis of Severe recurrent major depression with psychotic features ([address]    Patient's condition is improving. Patient requires continued inpatient hospitalization for further stabilization, safety monitoring and medication management. I will continue to coordinate the provision of individual, milieu, occupational, group, and substance abuse therapies to address target symptoms/diagnoses as deemed appropriate for the individual patient. A coordinated, multidisplinary treatment team round was conducted with the patient (this team consists of the nurse, psychiatric unit pharmacist,  and writer). Complete current electronic health record for patient has been reviewed today including consultant notes, ancillary staff notes, nurses and psychiatric tech notes. Suicide risk assessment completed and patient deemed to be of low risk for suicide at this time. The following regarding medications was addressed during rounds with patient:   the risks and benefits of the proposed medication. The patient was given the opportunity to ask questions. Informed consent given to the use of the above medications. Will continue to adjust psychiatric and non-psychiatric medications (see above \"medication\" section and orders section for details) as deemed appropriate & based upon diagnoses and response to treatment. I will continue to order blood tests/labs and diagnostic tests as deemed appropriate and review results as they become available (see orders for details and above listed lab/test results). I will order psychiatric records from previous [facility] hospitals to further elucidate the nature of patient's psychopathology and review once available. I will gather additional collateral information from friends, family and o/p treatment team to further elucidate the nature of patient's psychopathology and baselline level of psychiatric functioning.          I certify that this patient's inpatient psychiatric hospital services furnished since the previous certification were, and continue to be, required for treatment that could reasonably be expected to improve the patient's condition, or for diagnostic study, and that the patient continues to need, on a daily basis, active treatment furnished directly by or requiring the supervision of inpatient psychiatric facility personnel. In addition, the hospital records show that services furnished were intensive treatment services, admission or related services, or equivalent services.     EXPECTED DISCHARGE DATE/DAY: TBD     DISPOSITION: Home       Signed By:   Amish Nevarez MD  5/8/2022

## 2022-05-09 NOTE — BH NOTES
Patient remains on the unit. She has been calm and cooperative. She reports her mood is not as dysphoric, affect is restricted. She states that her AH are still present but not as intense. She is medication compliant. She denies SI/HI, or any delusions. Insight and judgment are fair. She is doing her ADL care. She does plan to return home with her  and she will be referred to [facility] for outpatient treatment. . She will most likely be discharged in 1-3 days. She has been participating in groups and is pleasant and appropriate with peers and staff.

## 2022-05-09 NOTE — PROGRESS NOTES
05/09/22 150 [facility] meeting   Attendance Attended   Number of participants 3   Time in 1030   Time out 1115   Total Time 45   Interventions/techniques Supported   Follows directions Followed directions   Interactions Interacted appropriately   Mental Status Anxious;Depressed   Behavior/appearance Cooperative   Suicide Risk Factors SHE STATES NO THOUGHTS OF HURTING HERSELF OR OTHERS   Suicide Protective Factors Denies intent   Goals Achieved Able to listen to others; Able to engage in interactions; Able to give feedback to another   Nati Dominic states that states that she feel somewhat safe an she slept good she states that her anxiety an depression is 6 she states that she is in no pain an she states that her goal is to get straight to leave     Tunde Schafer ,CHUYITAA

## 2022-05-09 NOTE — PROGRESS NOTES
05/08/22 2039 [facility]   Attendance Did not attend   Number of participants 3   Time in 2000   Time out 2030   Total Time 30   MTP problem Depression

## 2022-05-09 NOTE — BH NOTES
Behavioral Health Interdisciplinary Rounds     Patient Name: Colby Agosto  Age: 49 y.o. Room/Bed:  234/02  Primary Diagnosis: Severe recurrent major depression with psychotic features ([facility] [address]   Admission Status: Voluntary     Readmission within 30 days: no  Power of  in place: no  Patient requires a blocked bed: no          Reason for blocked bed:     VTE Prophylaxis: Not indicated    Mobility needs/Fall risk: yes  Flu Vaccine : no   Nutritional Plan: no  Consults: no         Labs/Testing due today?: no    Sleep hours: 5.75       Participation in Care/Groups:  yes  Medication Compliant?: Yes  PRNS (last 24 hours): None    Restraints (last 24 hours):  no     CIWA (range last 24 hours):     COWS (range last 24 hours):      Alcohol screening (AUDIT) completed -   AUDIT Score: 0     If applicable, date SBIRT discussed in treatment team AND documented:   AUDIT Screen Score: AUDIT Score: 0      Document Brief Intervention (corresponds directly with the 5 A's, Ask, Advise, Assess, Assist, and Arrange): At- Risk Patients (Score 7-15 for women; 8-15 for men)  Discuss concern patient is drinking at unhealthy levels known to increase risk of alcohol-related health problems. Is Patient ready to commit to change? If No:   Encourage reflection   Discuss short term and long term health risks of consuming alcohol   Barriers to change   Reaffirm willingness to help / Educational materials provided  If Yes:   Set goal  This Week In provided    Harmful use or Dependence (Score 16 or greater)   Discuss short term and long term health risks of consuming alcohol   Recommendations   Negotiate drinking goal   Recommend addiction specialist/center   Arrange follow-up appointments.     Tobacco - patient is a smoker: Have You Used Tobacco in the Past 30 Days: No  Illegal Drugs use: Have You Used Any Illegal Substances Over the Past 12 Months: No    24 hour chart check complete: yes Patient goal(s) for today: continue to feel better  Treatment team focus/goals: remain safe in hospital  Progress note patient reports feeling much better, AH are less intense sleep has improved    LOS:  4  Expected LOS: 1-3    Financial concerns/prescription coverage:  no  Family contact: no      Family requesting physician contact today:  no  Discharge plan: home  Access to weapons : no        Outpatient provider(s): no  Patient's preferred phone number for follow up call : [phone]    Participating treatment team members: Dr. Jennifer Haley  (assigned SW), Nolberto Leonardo

## 2022-05-09 NOTE — PROGRESS NOTES
INTERVAL Hx:  Records and clinical information from the weekend were reviewed. States she's been feeling a little better, and \"much better\" than before she came into the hospital. Still hearing the [facility] which are only slightly less intense than PTA. She is sleeping better, but she feels tired from the meds, however. No other psychotic sx's noted and her N/V functioning has been improving slightly as well. Discussed Tx plans and goals, and we'll stay with the current regimen to make sure she's tolerating it adequately. Back on Lipitor--Chol was 454 and . Slept 5.75 hours, but she napped during the day. MEDS:  Current Facility-Administered Medications   Medication Dose Route Frequency    atorvastatin (LIPITOR) tablet 40 mg  40 mg Oral QHS    risperiDONE (RisperDAL) tablet 1 mg  1 mg Oral QHS    DULoxetine (CYMBALTA) capsule 60 mg  60 mg Oral DAILY    hydrOXYzine pamoate (VISTARIL) capsule 50 mg  50 mg Oral TID PRN    traZODone (DESYREL) tablet 50 mg  50 mg Oral QHS PRN    alum-mag hydroxide-simeth (MYLANTA) oral suspension 30 mL  30 mL Oral Q4H PRN    magnesium hydroxide (MILK OF MAGNESIA) 400 mg/5 mL oral suspension 30 mL  30 mL Oral DAILY PRN    acetaminophen (TYLENOL) tablet 1,000 mg  1,000 mg Oral Q6H PRN    ibuprofen (MOTRIN) tablet 400 mg  400 mg Oral Q6H PRN    levothyroxine (SYNTHROID) tablet 137 mcg  137 mcg Oral ACB    nicotine (NICODERM CQ) 21 mg/24 hr patch 1 Patch  1 Patch TransDERmal DAILY PRN       VITALS:   Patient Vitals for the past 12 hrs:   Temp Pulse Resp BP SpO2   05/09/22 0730 (!) 96.5 °F (35.8 °C) 95 18 (!) 105/58 95 %       LABS: .No results found for this or any previous visit (from the past 24 hour(s)).     MSE:   Appearance: casually dressed; adequately groomed  Behavior: calm and cooperative; no agitation  Motor: less slowed  Mood/Affect: less dysphoric; still restricted  Thought Process: more organized now  Thought Content: no delusions; no SI/HI  Perceptions: continued 's  Insight/Judgment: fair    ASSESSMENT:   1.  Major Depression, recurrent, severe with psychosis (F33.3)    PLAN:  1. Continue the Risperidone at 1 mg QHS. 2. Continue the Cymbalta at 60 mg daily. 3. ELOS: 1-3 days--F/U with the Antonio ARGUELLO.

## 2022-05-09 NOTE — GROUP NOTE
[facility] GROUP DOCUMENTATION INDIVIDUAL                                                                          Group Therapy Note    Date: 5/9/2022    Group Start Time: 0900  Group End Time: 1137  Group Topic: Process Group - Inpatient    [address], [address] GROUP DOCUMENTATION GROUP    Group Therapy Note    Attendees: Focus of session was on a [address] article The Praise of Anxiety by Dr. Pearl Bertrand. The article spoke about how we have learned how to fear anxiety and learn to avoid it rather than embrace it for what it is meant to do for us. We spoke about how treating all anxiety like a disease prevents us from understanding the difference between normal anxiety that is meant to be experienced and anxiety that is an actual disorder. We spoke about the positives of anxiety and how they can spur us forward in emergency situations or during times of high stress. We spoke about what the Mila Aguilar shared about how things would be different if we treated anxiety as a benefit, rather than a burden, and our bodies would follow suit to help better prepare us for the challenges ahead. We spoke about how group members can reframe their opinions and thoughts about anxiety to help them rather than believe all anxiety should be avoided. Attendance: Attended    Patient's Goal:       Verbalizes understanding of personal adaptive level of functioning               Interventions/techniques: Informed, Validated, Provide feedback and Supported    Follows Directions:  Followed directions    Interactions: Interacted appropriately    Mental Status: Calm, Congruent and Preoccupied    Behavior/appearance: Attentive, Cooperative and Negative    Goals Achieved: Able to engage in interactions, Able to receive feedback, Able to self-disclose, Discussed coping, Identified feelings, Identified triggers and Identified relapse prevention strategies      Additional Notes:  Bernard Rankin participated in group with prompting. She spoke about how she is still feeling tired from the medications but she is hopeful that she can get adjusted to them. She spoke about how she knows she continues to feel a lot anxiety that \"has developed as a result of the pandemic. We spoke about ideas for how she can overcome those thoughts and manage anxiety rather than letting it continue to be something that keeps her isolated at home.       Singh Francisco

## 2022-05-09 NOTE — BH NOTES
Affect blunted, mood flat. Pt attended and participated in scheduled group activities. Pt was minimally social with staff and peers but answered assessment questions appropriately. Pt ate 100% of snack. Pt vital signs stable with no complaints of pain. Pt denies SI/HI/AVH. Pt was compliant with medications and did not request a PRN. Pt is in no apparent distress at this time and made no delusional statements. Pt rested in her bed with her eyes closed for 5.75 hours.

## 2022-05-09 NOTE — BH NOTES
Affect blunted/constricted, mood depressed/anxious. Patient is alert & oriented x 4 with some disorganized thoughts at times. Patient denies SI/HI/AVH but having internal stimuli. No delusional statement made. Patient is compliant with medications. Patient appetite good eats 100% of all meals plus snacks. Patient attended and engaged in groups with prompting. Patient in no apparent distress all vital signs within normal limits. PRN Medication Documentation    Specific patient behavior that led to need for PRN medication: Patient c/o of having lower back pain 4/10. Staff interventions attempted prior to PRN being given: Assessment done. PRN medication given: Motrin 400 mg po at 0845. Patient response/effectiveness of PRN medication: Positive effects.

## 2022-05-09 NOTE — BH NOTES
Shift change report given to Yessenia Godinez re: SBAR, medications, and behavior.   [facility] fall risk score: 2

## 2022-05-10 PROCEDURE — 99231 SBSQ HOSP IP/OBS SF/LOW 25: CPT | Performed by: PSYCHIATRY & NEUROLOGY

## 2022-05-10 PROCEDURE — 74011250637 HC RX REV CODE- 250/637: Performed by: PSYCHIATRY & NEUROLOGY

## 2022-05-10 PROCEDURE — 65220000003 HC RM SEMIPRIVATE PSYCH

## 2022-05-10 RX ORDER — RISPERIDONE 2 MG/1
2 TABLET, FILM COATED ORAL
Status: DISCONTINUED | OUTPATIENT
Start: 2022-05-10 | End: 2022-05-11 | Stop reason: HOSPADM

## 2022-05-10 RX ADMIN — RISPERIDONE 2 MG: 2 TABLET ORAL at 21:20

## 2022-05-10 RX ADMIN — DULOXETINE HYDROCHLORIDE 60 MG: 30 CAPSULE, DELAYED RELEASE ORAL at 08:07

## 2022-05-10 RX ADMIN — IBUPROFEN 400 MG: 400 TABLET, FILM COATED ORAL at 08:07

## 2022-05-10 RX ADMIN — ATORVASTATIN CALCIUM 40 MG: 40 TABLET, FILM COATED ORAL at 21:20

## 2022-05-10 RX ADMIN — LEVOTHYROXINE SODIUM 137 MCG: 0.11 TABLET ORAL at 06:30

## 2022-05-10 NOTE — PROGRESS NOTES
Problem: Patient Education: Go to Patient Education Activity  Goal: Patient/Family Education  Outcome: Progressing Towards Goal     Problem: Falls - Risk of  Goal: *Absence of Falls  Description: Document Santa Murray Fall Risk and appropriate interventions in the flowsheet. Outcome: Progressing Towards Goal  Note: Fall Risk Interventions:       Mentation Interventions: Door open when patient unattended    Medication Interventions: Teach patient to arise slowly    Elimination Interventions:  Toilet paper/wipes in reach              Problem: Anxiety-Behavioral Health (Adult/Pediatric)  Goal: *STG: Participates in treatment plan  Outcome: Resolved/Met  Goal: *STG: Remains safe in hospital  Outcome: Resolved/Met  Goal: *STG: Seeks staff when feelings of anxiety and fear arise  Outcome: Resolved/Met  Goal: *STG: Attends activities and groups  Outcome: Resolved/Met  Goal: *STG/LTG: Trigger identification  Outcome: Resolved/Met  Goal: *STG/LTG: Complies with medication therapy  5/10/2022 0057 by Pa Sauceda RN  Outcome: Resolved/Met  5/9/2022 2021 by Pa Sauceda RN  Outcome: Progressing Towards Goal     Problem: Tobacco Use  Goal: *Tobacco use abstinence  Outcome: Resolved/Met  Goal: *Knowledge of tobacco use cessation methods  Outcome: Resolved/Met     Problem: Patient Education: Go to Patient Education Activity  Goal: Patient/Family Education  Outcome: Resolved/Met

## 2022-05-10 NOTE — PROGRESS NOTES
Shift change report given to Jaleel Brochure re: SBAR, medications, and behavior.   [facility] fall risk score: 2

## 2022-05-10 NOTE — PROGRESS NOTES
Patient said she had a pretty good day . Patient  said her depression is 4/10 . Patient said she has  no pain , no anxiety , and no SI . Patient said she is still working on her goal to get straight to leave    05/09/22 500 [facility] meeting   Attendance Attended   Number of participants 4   Time in 2000   Time out 2030   Total Time 30   MTP problem Depression; Fall risk   Interventions/techniques Provided feedback   Follows directions Followed directions   Interactions Interacted appropriately   Mental Status Calm;Depressed   Behavior/appearance Cooperative   Suicide Risk Factors No SI   Suicide Protective Factors Denies intent   Goals Achieved Able to listen to others; Able to reflect/comment on own behavior;Able to manage/cope with feelings

## 2022-05-10 NOTE — PROGRESS NOTES
Problem: Anxiety-Behavioral Health (Adult/Pediatric)  Goal: *STG/LTG: Complies with medication therapy  Outcome: Progressing Towards Goal     Problem: Patient Education: Go to Patient Education Activity  Goal: Patient/Family Education  Outcome: Progressing Towards Goal     Problem: Falls - Risk of  Goal: *Absence of Falls  Description: Document Cristal Mendez Fall Risk and appropriate interventions in the flowsheet. Outcome: Progressing Towards Goal  Note: Fall Risk Interventions:       Mentation Interventions: Door open when patient unattended    Medication Interventions: Teach patient to arise slowly    Elimination Interventions:  Toilet paper/wipes in reach              Problem: Anxiety-Behavioral Health (Adult/Pediatric)  Goal: *STG: Participates in treatment plan  Outcome: Resolved/Met  Goal: *STG: Remains safe in hospital  Outcome: Resolved/Met  Goal: *STG: Attends activities and groups  Outcome: Resolved/Met  Goal: *STG/LTG: Trigger identification  Outcome: Resolved/Met     Problem: Tobacco Use  Goal: *Tobacco use abstinence  Outcome: Resolved/Met

## 2022-05-10 NOTE — BH NOTES
Affect constricted, mood depressed/anxious. Alert and Oriented X 4. Cooperative. Attended and participated in group. Interacted with staff and peers. . Makes needs known. Ate  snacks. + AH Denied SI/HI/VH and pain. No delusional statements made. Medication compliant. Stable with no s/s of distress.  Laid in bed with eyes closed for 6 hours

## 2022-05-10 NOTE — PROGRESS NOTES
05/10/22 2 [address] [place] [address] meeting   Attendance Attended   Number of participants 2   Time in 1040   Time out 1100   Total Time 20   MTP problem Depression   Interventions/techniques Informed   Follows directions Followed directions   Interactions Interacted appropriately   Mental Status Depressed   Behavior/appearance Attentive   Suicide Risk Factors no thoughts of hurting herself no others

## 2022-05-10 NOTE — MASTER TREATMENT PLAN
Master Treatment Plan Review for Susanna Cordoba    Date of Admission Date Treatment Plan Reviewed Anticipated Date of Discharge Legal Status    5/5/22 5/9/22 TBD Voluntary     Treatment & Discharge Planning Changes    Modality or Intervention Changes 5/6- Urinalysis w/ reflex culture, Lipid panel, Hgb A1C     Psychotropic Medication Changes 5/6-  Modified Risperidone 0.5 mg PO every evening;          Cymbalta 60 mg PO daily   5/7-  Modified Risperidone 1 mg PO every bedtime     Discharge Planning or Target Date Changes ELOS: 1-3 days--F/U with the [facility]   New Issues   N/A     Treatment Team Signatures    I have participated in the development of this plan of treatment and agree to its implementation.     Patient Signature       Patient Printed Name Date/Time   /Therapist Signature       //Therapist Printed Name Date/Time   RN Signature       RN Printed Name Date/Time   Unit  Signature       Unit  Printed Name Date/Time   MD Signature       MD Printed Name Date/Time

## 2022-05-10 NOTE — BH NOTES
Shift change report given to Sherlyn Valero RN re: SBAR, medications, and behavior.   [facility] fall risk score 1

## 2022-05-10 NOTE — GROUP NOTE
[facility] GROUP DOCUMENTATION INDIVIDUAL                                                                          Group Therapy Note    Date: 5/10/2022    Group Start Time: 1300  Group End Time: 1350  Group Topic: Process Group - Inpatient    [address], [address] [facility] GROUP DOCUMENTATION GROUP    Group Therapy Note    Attendees: Focus of session was based on information provided in the book Don't sweat the small stuff by Dr. Sierra Yanes. I shared information from the chapter Barry Richardson for your Thoughts.   We spoke about the awareness of our thoughts and the ability to change them when we notice they are negative is a crucial skill to practice every day. We spoke about the small change that this really is but the big impact in can have on functioning and overall wellness. We spoke about the need to stay focused on this and to have it be a habit is crucial for us for us to be successful in managing our thinking patterns. Group members shared the struggles that they have with this and what they are willing to focus on. Attendance: Attended    Patient's Goal:       Demonstrates effective anxiety reduction strategies               Interventions/techniques: Informed, Validated, Promoted peer support and Supported    Follows Directions: Followed directions    Interactions: Disorganized interaction    Mental Status: Calm, Flat and Suspicious    Behavior/appearance: Attentive, Cooperative and Needed prompting    Goals Achieved: Able to engage in interactions, Able to receive feedback, Able to self-disclose, Discussed coping, Identified feelings and Identified triggers      Additional Notes:  Westley Arciniega participated in group with prompting. She spoke about how she \"is doing fine. Things are getting better slowly and I will be going home tomorrow. \"  She and her roommate had some conversation about how they both snore and pt did not seem to understand how roommate could \"know I snore if I have earplugs in. \"  I told her how and she still did not seem to comprehend. She said she is doing a lot of \"sitting on the side of my bed and meditating. \"      Liz [place]

## 2022-05-10 NOTE — BH NOTES
Affect blunted/constricted, mood depressed/anxious. Patient is alert & oriented x 4. Patient quiet and isolative but interacts with prompting. Patient denies SI/HI/VH/pain but continues to have auditory hallucinations. Patient smiling and laughing inappropriately. Patient having positive internal stimuli. Patient is compliant with medications. Patient appetite good eats 75%-100% of all meals plus snacks. Patient attended and engaged in groups with prompting.

## 2022-05-10 NOTE — PROGRESS NOTES
INTERVAL Hx:  Records and clinical information were reviewed. States she's been feeling \"OK\", but she's essentially plateaued at this level for the past several days. The AH's are still bothersome and she continues to be slightly guarded and isolative. Also continues to frequently look out the window as if distracted. She initially didn't want to adjust any of the meds, but then agreed to try a higher dose of the Risperidone in preparation for D/C. Has still felt somewhat tired but I explained that it will likely subside in the near future as she acclimates to it. No other med SE's noted or reported. No other psychotic sx's noted and her N/V functioning has been improving slightly as well. Slept 6 hours. MEDS:  Current Facility-Administered Medications   Medication Dose Route Frequency    risperiDONE (RisperDAL) tablet 2 mg  2 mg Oral QHS    atorvastatin (LIPITOR) tablet 40 mg  40 mg Oral QHS    DULoxetine (CYMBALTA) capsule 60 mg  60 mg Oral DAILY    hydrOXYzine pamoate (VISTARIL) capsule 50 mg  50 mg Oral TID PRN    traZODone (DESYREL) tablet 50 mg  50 mg Oral QHS PRN    alum-mag hydroxide-simeth (MYLANTA) oral suspension 30 mL  30 mL Oral Q4H PRN    magnesium hydroxide (MILK OF MAGNESIA) 400 mg/5 mL oral suspension 30 mL  30 mL Oral DAILY PRN    acetaminophen (TYLENOL) tablet 1,000 mg  1,000 mg Oral Q6H PRN    ibuprofen (MOTRIN) tablet 400 mg  400 mg Oral Q6H PRN    levothyroxine (SYNTHROID) tablet 137 mcg  137 mcg Oral ACB    nicotine (NICODERM CQ) 21 mg/24 hr patch 1 Patch  1 Patch TransDERmal DAILY PRN       VITALS:   Patient Vitals for the past 12 hrs:   Temp Pulse Resp BP SpO2   05/10/22 0745 (!) 96.2 °F (35.7 °C) 78 18 (!) 140/88 96 %       LABS: .No results found for this or any previous visit (from the past 24 hour(s)).     MSE:   Appearance: casually dressed; adequately groomed  Behavior: withdrawn; no agitation  Motor: less slowed  Mood/Affect: less dysphoric; still restricted  Thought Process: more organized now  Thought Content: no delusions; no SI/HI  Perceptions: continued AH's  Insight/Judgment: fair    ASSESSMENT:   1.  Major Depression, recurrent, severe with psychosis (F33.3)    PLAN:  1. Increase the Risperidone to 2 mg QHS. 2. Continue the Cymbalta at 60 mg daily. 3. ELOS: 1 day--F/U with the [facility].

## 2022-05-10 NOTE — PROGRESS NOTES
Problem: Falls - Risk of  Goal: *Absence of Falls  Description: Document Haley Nash Fall Risk and appropriate interventions in the flowsheet. Outcome: Progressing Towards Goal  Note: Fall Risk Interventions:       Mentation Interventions: Door open when patient unattended    Medication Interventions: Teach patient to arise slowly    Elimination Interventions:  Toilet paper/wipes in reach         Problem: Patient Education: Go to Patient Education Activity  Goal: Patient/Family Education  Outcome: Progressing Towards Goal

## 2022-05-11 VITALS
TEMPERATURE: 96.7 F | RESPIRATION RATE: 18 BRPM | DIASTOLIC BLOOD PRESSURE: 59 MMHG | OXYGEN SATURATION: 96 % | BODY MASS INDEX: 37.77 KG/M2 | SYSTOLIC BLOOD PRESSURE: 130 MMHG | HEART RATE: 81 BPM | WEIGHT: 234 LBS

## 2022-05-11 PROCEDURE — 99239 HOSP IP/OBS DSCHRG MGMT >30: CPT | Performed by: PSYCHIATRY & NEUROLOGY

## 2022-05-11 PROCEDURE — 74011250637 HC RX REV CODE- 250/637: Performed by: PSYCHIATRY & NEUROLOGY

## 2022-05-11 RX ORDER — IBUPROFEN 200 MG
1 TABLET ORAL
Qty: 30 PATCH | Refills: 0 | Status: SHIPPED | OUTPATIENT
Start: 2022-05-11 | End: 2022-06-10

## 2022-05-11 RX ORDER — ATORVASTATIN CALCIUM 40 MG/1
40 TABLET, FILM COATED ORAL
Qty: 30 TABLET | Refills: 1 | Status: SHIPPED | OUTPATIENT
Start: 2022-05-11

## 2022-05-11 RX ORDER — DULOXETIN HYDROCHLORIDE 60 MG/1
60 CAPSULE, DELAYED RELEASE ORAL DAILY
Qty: 30 CAPSULE | Refills: 1 | Status: SHIPPED | OUTPATIENT
Start: 2022-05-12

## 2022-05-11 RX ORDER — LEVOTHYROXINE SODIUM 137 UG/1
137 TABLET ORAL
Qty: 30 TABLET | Refills: 1 | Status: SHIPPED | OUTPATIENT
Start: 2022-05-12

## 2022-05-11 RX ORDER — RISPERIDONE 2 MG/1
2 TABLET, FILM COATED ORAL
Qty: 30 TABLET | Refills: 1 | Status: SHIPPED | OUTPATIENT
Start: 2022-05-11

## 2022-05-11 RX ADMIN — LEVOTHYROXINE SODIUM 137 MCG: 0.11 TABLET ORAL at 06:32

## 2022-05-11 RX ADMIN — IBUPROFEN 400 MG: 400 TABLET, FILM COATED ORAL at 09:06

## 2022-05-11 RX ADMIN — DULOXETINE HYDROCHLORIDE 60 MG: 30 CAPSULE, DELAYED RELEASE ORAL at 09:06

## 2022-05-11 NOTE — DISCHARGE INSTRUCTIONS
Patient Education        Learning About Depression Screening  What is depression screening? Depression screening is a way to see if you have depression symptoms. It may be done by a doctor or counselor. It's often part of a routine checkup. That's because your mental health is just as important as your physical health. Depression is a medical illness that affects how you feel, think, and act. You may:  · Have less energy. · Lose interest in your daily activities. · Feel sad and grouchy for a long time. Depression is very common. It affects men and women of all ages. Many things can trigger depression. Some people become depressed after they have a stroke or find out they have a major illness like cancer or heart disease. The death of a loved one, a breakup, or changes in the natural brain chemicals may lead to depression. It can run in families. Most experts believe that a combination of inherited genes and stressful life events can cause it. What happens during screening? You may be asked to fill out a form about your depression symptoms. You and the doctor will discuss your answers. The doctor may ask you more questions to learn more about how you think, act, and feel. What happens after screening? If you have signs of depression, your doctor will talk to you about your options. Doctors usually treat depression with medicines or counseling. Often, combining the two works best. Many people don't get help because they think that they'll get over the depression on their own. But people with depression may not get better unless they get treatment. Many people feel embarrassed or ashamed about having depression. But it isn't a sign of personal weakness. It's not a character flaw. A person who is depressed is not \"crazy. \" Depression is caused by changes in the brain. A serious symptom of depression is thinking about death or suicide.  If you or someone you care about talks about this or about feeling hopeless, get help right away. It's important to know that depression can be treated. The first step toward feeling better is often just seeing that the problem exists. Where can you learn more? Go to http://www.gray.com/  Enter T185 in the search box to learn more about \"Learning About Depression Screening. \"  Current as of: June 16, 2021               Content Version: 13.2  © 2006-2022 [employer]. Care instructions adapted under license by [employer] (which disclaims liability or warranty for this information). If you have questions about a medical condition or this instruction, always ask your healthcare professional. [place] [address] any warranty or liability for your use of this information. Patient Education        Learning About Mood Disorders  What are mood disorders? Mood disorders are medical problems that affect how you feel. They can impact your moods, thoughts, and actions. Mood disorders include:  · Depression. This causes you to feel sad or hopeless for much of the time. · Bipolar disorder. This causes extreme mood changes from manic episodes of very high energy to extreme lows of depression. · Seasonal affective disorder (SAD). This is a type of depression that affects you during the same season each year. Most often people experience SAD during the fall and winter months when days are shorter and there is less light. What are the symptoms? Depression  You may:  · Feel sad or hopeless nearly every day. · Lose interest in or not get pleasure from most daily activities. You feel this way nearly every day. · Have low energy, changes in your appetite, or changes in how well you sleep. · Have trouble concentrating. · Think about death and suicide. Keep the numbers for these national suicide hotlines: 1-353-990-TALK (1-953.782.1692) and 1-274-ZCKCJJQ (8-836.678.1559).  If you or someone you know talks about suicide or feeling hopeless, get help right away. Bipolar disorder  Symptoms depend on your mood swings. You may:  · Feel very happy, energetic, or on edge. · Feel like you need very little sleep. · Feel overly self-confident. · Do impulsive things, such as spending a lot of money. · Feel sad or hopeless. · Have racing thoughts or trouble thinking and making decisions. · Lose interest in things you have enjoyed in the past.  · Think about death and suicide. Keep the numbers for these national suicide hotlines: 8-295-763-TALK (3-837.306.5889) and 2-553-KPNIPFI (8-262.277.6855). If you or someone you know talks about suicide or feeling hopeless, get help right away. Seasonal affective disorder (SAD)  Symptoms come and go at about the same time each year. For most people with SAD, symptoms come during the winter when there is less daylight. You may:  · Feel sad, grumpy, coffey, or anxious. · Lose interest in your usual activities. · Eat more and crave carbohydrates, such as bread and pasta. · Gain weight. · Sleep more and feel drowsy during the daytime. How are mood disorders treated? Mood disorders can be treated with medicines or counseling, or a combination of both. Medicines for depression and SAD may include antidepressants. Medicines for bipolar disorder may include:  · Mood stabilizers. · Antipsychotics. · Benzodiazepines. Counseling may involve cognitive-behavioral therapy. It teaches you how to change the ways you think and behave. This can help you stop thinking bad thoughts about yourself and your life. Light therapy is the main treatment for SAD. This therapy uses a special kind of lamp. You let the lamp shine on you at certain times, usually in the morning. This may help your symptoms during the months when there is less sunlight. Healthy lifestyle  Healthy lifestyle changes may help you feel better. · Be active often. You might try walking or strength training. · Eat a healthy diet.  Include fruits, vegetables, lean proteins, and whole grains in your diet each day. · Keep a regular sleep schedule. Try for 8 hours of sleep a night. · Find ways to manage stress, such as relaxation exercises. · Avoid alcohol and illegal drugs. Follow-up care is a key part of your treatment and safety. Be sure to make and go to all appointments, and call your doctor if you are having problems. It's also a good idea to know your test results and keep a list of the medicines you take. Where can you learn more? Go to http://www.gray.com/  Enter Z126 in the search box to learn more about \"Learning About Mood Disorders. \"  Current as of: June 16, 2021               Content Version: 13.2  © 2006-2022 [employer]. Care instructions adapted under license by [employer] (which disclaims liability or warranty for this information). If you have questions about a medical condition or this instruction, always ask your healthcare professional. [place] [address] any warranty or liability for your use of this information. BEHAVIORAL HEALTH NURSING DISCHARGE NOTE      The following personal items collected during your admission are returned to you:   Dental Appliance: Dental Appliances: None  Vision:    Hearing Aid:    Jewelry: Jewelry: None  Clothing: Clothing: Other (comment) (see property sheet)  Other Valuables: Other Valuables: Noel Singleton (comment),Other (comment) ($16.00 cash)  Valuables sent to safe:        PATIENT INSTRUCTIONS:    What to do at Home:    You may resume normal activities. Avoid making any critical decisions for at least 24-hours. Recommended diet: Regular Diet. Recommended activity: Activity as tolerated. National Suicide Prevention Line: 7-618-065-633.778.4499. [place] [facility] 8-427-101-435.356.9128. Smoking Cessation Hotline 1-881-QUIT NOW (495-8422).     If you have problems relating to your recovery, call your physician. The discharge information has been reviewed with the patient. The patient verbalized understanding.

## 2022-05-11 NOTE — BH NOTES
Behavioral Health Transition Record to Provider    Patient Name: Courtney Yusuf  YOB: 1965  Medical Record Number: [id number]  Date of Admission: 5/5/2022  Date of Discharge: 5/11/2022    Attending Provider: Jeannette Padilla MD  Discharging Provider: Hope Baez  To contact this individual call [phone] and ask the  to page. If unavailable, ask to be transferred to [facility] Provider on call. [facility] Provider will be available on call 24/7 and during holidays. Primary Care Provider: Seb Freeman MD    Allergies   Allergen Reactions    Sulfa (Sulfonamide Antibiotics) Hives       Reason for Admission: psychosis, depression    Admission Diagnosis: Major depressive disorder [F32.9]    * No surgery found *    Results for orders placed or performed during the hospital encounter of 05/05/22   URINALYSIS W/ REFLEX CULTURE    Specimen: Urine   Result Value Ref Range    Color YELLOW/STRAW      Appearance CLEAR CLEAR      Specific gravity 1.012 1.003 - 1.030      pH (UA) 6.0 5.0 - 8.0      Protein Negative NEG mg/dL    Glucose Negative NEG mg/dL    Ketone Negative NEG mg/dL    Bilirubin Negative NEG      Blood Negative NEG      Urobilinogen 0.2 0.2 - 1.0 EU/dL    Nitrites Negative NEG      Leukocyte Esterase Negative NEG      WBC 0-4 0 - 4 /hpf    RBC 0-5 0 - 5 /hpf    Epithelial cells FEW FEW /lpf    Bacteria Negative NEG /hpf    UA:UC IF INDICATED CULTURE NOT INDICATED BY UA RESULT CNI     LIPID PANEL   Result Value Ref Range    Cholesterol, total 454 (H) <200 MG/DL    Triglyceride 224 (H) <150 MG/DL    HDL Cholesterol 55 MG/DL    LDL, calculated 354.2 (H) 0 - 100 MG/DL    VLDL, calculated 44.8 MG/DL    CHOL/HDL Ratio 8.3 (H) 0.0 - 5.0     HEMOGLOBIN A1C WITH EAG   Result Value Ref Range    Hemoglobin A1c 6.2 (H) 4.0 - 5.6 %    Est. average glucose 131 mg/dL       Immunizations administered during this encounter:    There is no immunization history on file for this patient. Screening for Metabolic Disorders for Patients on Antipsychotic Medications  (Data obtained from the EMR)    Estimated Body Mass Index  Estimated body mass index is 37.77 kg/m² as calculated from the following:    Height as of 5/4/22: 5' 6\" (1.676 m). Weight as of this encounter: 106.1 kg (234 lb). Vital Signs/Blood Pressure  Visit Vitals  BP (!) 130/59 (BP 1 Location: Left upper arm, BP Patient Position: Sitting)   Pulse 81   Temp (!) 96.7 °F (35.9 °C)   Resp 18   Wt 106.1 kg (234 lb)   SpO2 96%   BMI 37.77 kg/m²       Blood Glucose/Hemoglobin A1c  Lab Results   Component Value Date/Time    Glucose 145 (H) 05/04/2022 04:19 PM       Lab Results   Component Value Date/Time    Hemoglobin A1c 6.2 (H) 05/07/2022 07:21 AM        Lipid Panel  Lab Results   Component Value Date/Time    Cholesterol, total 454 (H) 05/07/2022 07:21 AM    HDL Cholesterol 55 05/07/2022 07:21 AM    LDL, calculated 354.2 (H) 05/07/2022 07:21 AM    Triglyceride 224 (H) 05/07/2022 07:21 AM    CHOL/HDL Ratio 8.3 (H) 05/07/2022 07:21 AM        Discharge Diagnosis: Major Depression, recurrent, severe with psychosis (F33.3    Discharge Plan: Discharge plan of care/case management plan validated with provider discharge order. Discharge Medication List and Instructions:   Current Discharge Medication List      START taking these medications    Details   DULoxetine (CYMBALTA) 60 mg capsule Take 1 Capsule by mouth daily. Qty: 30 Capsule, Refills: 1  Start date: 5/12/2022      nicotine (NICODERM CQ) 21 mg/24 hr 1 Patch by TransDERmal route daily as needed for PRN Reason (Other) (nicotine with.) for up to 30 days. Qty: 30 Patch, Refills: 0  Start date: 5/11/2022, End date: 6/10/2022      risperiDONE (RisperDAL) 2 mg tablet Take 1 Tablet by mouth nightly.   Qty: 30 Tablet, Refills: 1  Start date: 5/11/2022         CONTINUE these medications which have CHANGED    Details   atorvastatin (LIPITOR) 40 mg tablet Take 1 Tablet by mouth nightly. Qty: 30 Tablet, Refills: 1  Start date: 5/11/2022      levothyroxine (SYNTHROID) 137 mcg tablet Take 1 Tablet by mouth Daily (before breakfast). Qty: 30 Tablet, Refills: 1  Start date: 5/12/2022         STOP taking these medications       cephALEXin (Keflex) 500 mg capsule Comments:   Reason for Stopping:         LORazepam (Ativan) 0.5 mg tablet Comments:   Reason for Stopping:               Unresulted Labs (24h ago, onward)            None        To obtain results of studies pending at discharge, please contact     Follow-up Information     Follow up With Specialties Details Why Contact Info    Lluvia Oviedo MD Family Medicine Go to As needed [address] [place] [address] [phone]      [facility] to call or walk in for intake apointment Mon-Wed 7:30a-3p, Thurs 7:30a-5:30p, Fri 7:30a-11a , For medication management and/or therapy [address] [place] [address]  [phone]          Advanced Directive:   Does the patient have an appointed surrogate decision maker? No  Does the patient have a Medical Advance Directive? No  Does the patient have a Psychiatric Advance Directive? No  If the patient does not have a surrogate or Medical Advance Directive AND Psychiatric Advance Directive, the patient was offered information on these advance directives Yes and Patient will complete at a later time    Patient Instructions: Please continue all medications until otherwise directed by physician. Tobacco Cessation Discharge Plan:   Is the patient a smoker and needs referral for smoking cessation? yes  Patient referred to the following for smoking cessation with an appointment? Yes     Patient was offered medication to assist with smoking cessation at discharge? Yes  Was education for smoking cessation added to the discharge instructions?  Yes    Alcohol/Substance Abuse Discharge Plan:   Does the patient have a history of substance/alcohol abuse and requires a referral for treatment? No  Patient referred to the following for substance/alcohol abuse treatment with an appointment? Not applicable  Patient was offered medication to assist with alcohol cessation at discharge? Not applicable  Was education for substance/alcohol abuse added to discharge instructions? Not applicable    Patient discharged to Home; discussed with patient/caregiver and provided to the patient/caregiver either in hard copy or electronically.

## 2022-05-11 NOTE — BH NOTES
Shift change report given to Dennys Lugo RN re: SBAR, medications, and behavior.   [facility] fall risk score 1

## 2022-05-11 NOTE — PROGRESS NOTES
Problem: Falls - Risk of  Goal: *Absence of Falls  Description: Document Lam[facility] Fall Risk and appropriate interventions in the flowsheet. Outcome: Progressing Towards Goal  Note: Fall Risk Interventions:       Mentation Interventions: Door open when patient unattended    Medication Interventions: Teach patient to arise slowly    Elimination Interventions:  Toilet paper/wipes in reach              Problem: Anxiety-Behavioral Health (Adult/Pediatric)  Goal: *STG: Demonstrates decrease in ritualistic behavior  Outcome: Resolved/Met

## 2022-05-11 NOTE — DISCHARGE SUMMARY
[facility] DISCHARGE    Name:  Michelle Costello  MR#:  [id number]  :  1965  ACCOUNT #:  [de-identified]  ADMIT DATE:  2022  DISCHARGE DATE:  2022    [facility] DISCHARGE SUMMARY    NOTE:  Greater than 35 minutes was spent in the preparation of this discharge. DISCHARGE DIAGNOSES:  AXIS I:  Major Depression, recurrent, severe, with psychosis (F33.3). AXIS II:  Deferred. AXIS III:  Graves' disease with resulting hypothyroidism; prediabetes; severe hyperlipidemia. AXIS IV:  Stressors are mild to moderate. AXIS V:  Global Assessment of Functioning at discharge is 72. DISCHARGE MEDICATIONS:  1. Risperidone 2 mg at bedtime (new) - - #30 pills, with one refill. 2.  Cymbalta 60 mg daily (new) - - #30 pills, with one refill. 3.  Lipitor 40 mg at bedtime - - #30 pills, with one refill   4. Synthroid 137 mcg daily - - #30 pills, with one refill. 5.  Nicoderm patch 21 mg daily - - #30 patches, with no refills - - she will likely not take this and instead use nicotine gum. DISPOSITION/FOLLOWUP PLANS:  The patient is being discharged in stable condition later today on 2022 in the care of her . She will be returning home, and she was instructed to follow up with the [address] [place] [address] , contacting them as soon as possible for an intake appointment per their protocol. She also was instructed to follow up with her primary care physician to recheck a lipid panel in the next several weeks or so to see if the dose of Lipitor needs to be adjusted or other interventions pursued. HOSPITAL COURSE:  As noted in the admission note, the patient is a 81-year-old  white female who has a past psychiatric history of Depression and who was admitted voluntarily from the [facility] Emergency Room due to worsening depressive symptoms including some auditory hallucinations that had been present for the past three months or more.     She attributed a lot of her depression to the protracted isolation and limitations that the [facility] had caused her but perhaps more importantly, she had stopped taking any medication a number of months ago. This included her thyroid medicine and her cholesterol medication. Not surprisingly, her TSH was over 100, and her lipid panel was significantly elevated as noted below. However, the depression had continued to slowly build likely well before she stopped taking any medication, and her neurovegetative functioning was quite poor. She also exhibited some psychomotor slowing, and the auditory hallucinations were the primary psychotic symptom she was experiencing but they were the quite distressing and quite strong. They were telling her to \"get out of the house,\" and other types of commands which she is able to refrain from acting on. On admission, we had started her on Cymbalta as she stated she had done well with that medication many years ago, and she was comfortable taking it. She had no side effects from that medicine at all, and she was aware that it really had not had a chance to take effect to any appreciable degree by the time of her discharge. Perhaps more importantly, we started a trial on risperidone initially at 0.5 mg in the evening and 0.25 mg in the morning. It did seem to cause her some sedation during the day, and we switched it to 1 mg at bedtime, and she tolerated that quite well, but she did still have some moments of feeling slightly tired. However, the voices improved compared to prior to admission but they seemed to plateau and still were fairly bothersome, so she was agreeable to increasing it to 2 mg at bedtime which we did on the night before her discharge. She actually tolerated that just as easily and stated she felt a little brighter and a little better the next morning. The voices were still present but not quite as strong, and she did not appear to be more sedated in any fashion. Similarly, there was no evidence of any EPS or TD symptoms. She was wanting to be discharged but not pushing the issue, but she certainly was not meeting criteria for requiring continued hospitalization, and she was felt to be quite safe and stable for discharge when she was released on 05/11/2022. Her  reportedly felt the same way and noticed some notable improvement in her condition according to the patient. She was still a bit flat, and there was a slight oddness to her presentation due to her emotional flatness, but she showed no real evidence of cognitive impairment or any other deficits that would warrant further investigation. She had no medical or physical problems while she was here except for being put back on the thyroid medication and started back on Lipitor for the reasons noted above. LABORATORY AND DIAGNOSTIC DATA:  A chemistry screen in the emergency room showed a slightly elevated creatinine of 1.18 and a glucose of 145. White count was slightly elevated at 13.6 and potassium was minimally low at 3.4. A head CT scan was entirely normal.  TSH was greater than 100. EKG showed a normal QTc interval of 452 msec. On 05/07/2022, a lipid panel showed elevated cholesterol of 454 and an LDL of 354 with a minimally elevated triglycerides of 224. HDL was 55. Hemoglobin A1c was minimally elevated at 6.2%. A urine drug screen and alcohol levels both negative.       Jose Ferraro MD      [id number]  D:  05/11/2022 10:27  T:  05/11/2022 12:16  JOB #:  [id number]  CC:  [facility]

## 2022-05-11 NOTE — PROGRESS NOTES
Patient Goal: To take a hot shower and eat some good food. Patient appetite was good, no physical pain, no suicidal thought, Patient stated she has no physical pain, no suicidal thoughts, Patient stated that she has depression level 2 and anxiety level 2   05/11/22 1128   [facility] meeting   Attendance Attended   Number of participants 4   Time in 1030   Time out 1100   Total Time 30   MTP problem Depression   Interventions/techniques Provided feedback   Mental Status Depressed;Flat;Preoccupied   Behavior/appearance Cooperative   Suicide Risk Factors no suicidal thoughts. Goals Achieved Able to receive feedback; Able to give feedback to another

## 2022-05-11 NOTE — PROGRESS NOTES
05/10/22 2054   2000 [facility] meeting   Attendance Did not attend   Number of participants 4   Time in 2030   Time out 2100   Total Time 30   MTP problem Depression; Fall risk   Interventions/techniques Informed   Follows directions Followed directions   Interactions Interacted appropriately   Mental Status Calm   Behavior/appearance Attentive   Suicide Risk Factors no thoughts of hurting herself nor others

## 2022-05-11 NOTE — BH NOTES
Discharge Note:      Patient discharged home today. Patient is alert & oriented x 4. Patient is cooperative and pleasant. Patient denies SI/HI/AVH. No delusional statements made. Patient remains depressed but mood brighter. Patient is compliant with medications and PRN Motrin 400 mg po at 0906 for lower back pain 5/10 with positive effects. Patient appetite good eats 75%-100% breakfast plus a snack. Patient attended and engaged in groups with prompting. Patient in no apparent distress all vital signs within normal limits. Rn reviewed discharge orders and instructions which included: medication drug name, purpose, doses, administration times, route, and adverse effects. Patient verbalized understanding and provided written copies of discharge orders and instructions along with prescriptions. Patient left the unit @ 1140 wearing a face mask and accompanied by staff. All valuables orders and instructions sent with patient.

## 2022-05-11 NOTE — GROUP NOTE
STEVIE  GROUP DOCUMENTATION INDIVIDUAL                                                                          Group Therapy Note    Date: 5/11/2022    Group Start Time: 0900  Group End Time: 0153  Group Topic: Process Group - Inpatient    [facility] OP    [place], [address] [facility] GROUP DOCUMENTATION GROUP    Group Therapy Note      Focus of session was based on a handout about self-care and different aspects that we can focus on daily. We spoke about the different areas of focus which includes physical, emotional, workplace, psychological, relationship, and spiritual.  We spoke about the different strategies and what group members would like to focus on to improve their self-care for their short and long term recovery.          Attendance: {[facility] ATTENDANCE:[id number]}    Patient's Goal:  ***    Interventions/techniques: {[facility] GROUP DOC INTERVENTIONS/TECHNIQUES:83785}    Follows Directions: {[facility] FOLLOWS DIRECTION:38945}    Interactions: {[facility] INTERACTIONS:13170}    Mental Status: {[facility] GROUP DOC MENTAL STATUS:89589}    Behavior/appearance: {[facility] GROUP DOC BEHAVIOR/APPEARANCE:38481}    Goals Achieved: {[facility] GROUP DOC GOALS ACHIEVED:14029}      Additional Notes:  ***    Missy Basilio

## 2022-05-11 NOTE — BH NOTES
Affect constricted, mood depressed/anxious. Alert and Oriented X 4. Cooperative. Refused to attend group. Interacted with staff when prompted. Withdrawn. Makes needs known. + AH Denied SI/HI/VH and pain. No delusional statements made. Medication compliant.  Stable with no s/s of distress. Laid in bed with eyes closed for 8 hours

## 2022-05-11 NOTE — BH NOTES
Patient will be discharged to home with her  today. She was given the information to follow up with [facility]. KAY contacted the agency and was given the hours and dates for the same day access. She is to call or walk in during those times instructed. KAY was not allowed to schedule an appointment for the patient. She was given a copy of her [address] [place] [address] transition of care to take with her and a copy was sent to her PCP. She was involved and agreeable in her discharge plan.